# Patient Record
Sex: FEMALE | Race: WHITE | NOT HISPANIC OR LATINO | Employment: OTHER | URBAN - METROPOLITAN AREA
[De-identification: names, ages, dates, MRNs, and addresses within clinical notes are randomized per-mention and may not be internally consistent; named-entity substitution may affect disease eponyms.]

---

## 2017-12-14 ENCOUNTER — OFFICE VISIT (OUTPATIENT)
Dept: URGENT CARE | Facility: PHYSICIAN GROUP | Age: 62
End: 2017-12-14
Payer: COMMERCIAL

## 2017-12-14 ENCOUNTER — HOSPITAL ENCOUNTER (OUTPATIENT)
Dept: RADIOLOGY | Facility: MEDICAL CENTER | Age: 62
End: 2017-12-14
Attending: EMERGENCY MEDICINE
Payer: COMMERCIAL

## 2017-12-14 VITALS
BODY MASS INDEX: 26.97 KG/M2 | SYSTOLIC BLOOD PRESSURE: 140 MMHG | WEIGHT: 171.8 LBS | TEMPERATURE: 98.2 F | HEIGHT: 67 IN | HEART RATE: 107 BPM | OXYGEN SATURATION: 95 % | DIASTOLIC BLOOD PRESSURE: 80 MMHG

## 2017-12-14 DIAGNOSIS — R05.9 COUGH: ICD-10-CM

## 2017-12-14 DIAGNOSIS — J45.20 MILD INTERMITTENT ASTHMA WITHOUT COMPLICATION: ICD-10-CM

## 2017-12-14 PROCEDURE — 71020 DX-CHEST-2 VIEWS: CPT

## 2017-12-14 PROCEDURE — 99214 OFFICE O/P EST MOD 30 MIN: CPT | Performed by: EMERGENCY MEDICINE

## 2017-12-14 RX ORDER — BENZONATATE 100 MG/1
100 CAPSULE ORAL 3 TIMES DAILY PRN
Qty: 60 CAP | Refills: 0 | Status: SHIPPED | OUTPATIENT
Start: 2017-12-14 | End: 2018-08-28

## 2017-12-14 RX ORDER — DOXYCYCLINE HYCLATE 100 MG
100 TABLET ORAL 2 TIMES DAILY
Qty: 20 TAB | Refills: 0 | Status: SHIPPED | OUTPATIENT
Start: 2017-12-14 | End: 2018-08-28

## 2017-12-14 RX ORDER — BUDESONIDE AND FORMOTEROL FUMARATE DIHYDRATE 160; 4.5 UG/1; UG/1
2 AEROSOL RESPIRATORY (INHALATION) 2 TIMES DAILY
Qty: 1 INHALER | Refills: 0 | Status: SHIPPED | OUTPATIENT
Start: 2017-12-14 | End: 2021-07-06 | Stop reason: SDUPTHER

## 2017-12-14 NOTE — PROGRESS NOTES
"Subjective:      Lona Ochoa is a 62 y.o. female who presents with Cough (chills, fever, sinus pain, nausea, x1 month, discuss inhaler)            HPI  Patient is a pleasant 62-year-old female complaining of cough and URI symptoms for the past month which include sinus pressure nausea. Patient is interested in using an inhaler.    Allergies   Allergen Reactions   • Sulfa Drugs      2002-02-20;arthritic symptoms  \"due to my lupus\"     Social History     Social History   • Marital status:      Spouse name: N/A   • Number of children: N/A   • Years of education: N/A     Occupational History   • Not on file.     Social History Main Topics   • Smoking status: Never Smoker   • Smokeless tobacco: Never Used      Comment: denies   • Alcohol use 1.5 oz/week     3 Glasses of wine per week      Comment: holidays   • Drug use: No      Comment: denies   • Sexual activity: Yes     Partners: Male     Other Topics Concern   • Not on file     Social History Narrative   • No narrative on file     Past Medical History:   Diagnosis Date   • ASTHMA    • ASTHMA 12/15/2011   • Cancer (CMS-HCC)     skin melanoma RLE   • Chemotherapy    • CKD (chronic kidney disease), stage III 12/2/2010   • Family history of multiple myeloma 6/11/2014   • Hypertension 12/2/2010   • Lupus (systemic lupus erythematosus) (CMS-HCC) 12/2/2010   • Melanoma of skin (CMS-HCC)    • Mitral valve prolapse    • Personal history of malignant melanoma of skin 2003    right leg     Current Outpatient Prescriptions on File Prior to Visit   Medication Sig Dispense Refill   • albuterol (PROAIR HFA) 108 (90 BASE) MCG/ACT Aero Soln inhalation aerosol Inhale 2 Puffs by mouth every 6 hours as needed for Shortness of Breath. 2 Inhaler 1   • amoxicillin (AMOXIL) 875 MG tablet Take 1 Tab by mouth 2 times a day. 14 Tab 0   • hydrocodone-acetaminophen (NORCO) 5-325 MG Tab per tablet TK 1 T PO Q 4 TO 6 HOURS PRN P  0   • Multiple Vitamin (MULTI-VITAMIN DAILY PO) Take  by " "mouth.     • aspirin (ASA) 81 MG CHEW Take 81 mg by mouth every day.     • vitamin D, Ergocalciferol, (DRISDOL) 97579 UNITS CAPS capsule Take  by mouth every 7 days.       No current facility-administered medications on file prior to visit.      Family History   Problem Relation Age of Onset   • Cancer Mother      multiple myloma   • Heart Disease Father 40   • Hyperlipidemia Sister    • Hypertension Sister    • Cancer Sister      st IV colon CA   • Arthritis Maternal Grandmother 85     gout     Review of Systems   Constitutional: Positive for fever.   HENT: Positive for congestion and sinus pain. Negative for ear discharge, ear pain and sore throat.    Eyes: Negative for discharge.   Respiratory: Positive for cough. Negative for hemoptysis and stridor.    Cardiovascular: Negative for chest pain and palpitations.   Gastrointestinal: Negative for nausea and vomiting.   Musculoskeletal: Negative for neck pain.   Skin: Negative for itching and rash.   Neurological: Negative for sensory change and speech change.          Objective:     /80   Pulse (!) 107   Temp 36.8 °C (98.2 °F)   Ht 1.689 m (5' 6.5\")   Wt 77.9 kg (171 lb 12.8 oz)   SpO2 95%   BMI 27.31 kg/m²      Physical Exam   Constitutional: She appears well-developed and well-nourished. No distress.   HENT:   Head: Normocephalic and atraumatic.   Right Ear: External ear normal.   Left Ear: External ear normal.   Eyes: Right eye exhibits no discharge. Left eye exhibits no discharge.   Cardiovascular: Normal rate and regular rhythm.    Pulmonary/Chest: Breath sounds normal. She has no wheezes. She has no rales.   Abdominal: She exhibits no distension. There is no tenderness.   Musculoskeletal: Normal range of motion.   Neurological: She is alert.   Skin: Skin is warm and dry. She is not diaphoretic.   Psychiatric: She has a normal mood and affect. Her behavior is normal.   Vitals reviewed.            Chest x-ray negative for acute " infiltrates.  Assessment/Plan:     1. Cough    I am recommending the patient initiate/ continue hydration efforts including the use of a vaporizer/humidifier/ netti pot. I also recommend the pt, initiate Mucinex (if older than 4) Sudafed or Dayquil if not hypertensive. In addition the patient will initiate the prescribed prescription medication/s: Patient will be put on Vibramycin 100 mg twice a day given a prescription for Tessalon Perles as well as refill on her Symbicort she has albuterol inhalers.. If the patient's condition exacerbates with worsening dysphagia, shortness of breath, uncontrolled fever, headache or chest pressure he/she will return immediately to the urgent care or go to  the emergency department for further evaluation.    Vna Belcher    - DX-CHEST-2 VIEWS; Future  - benzonatate (TESSALON) 100 MG Cap; Take 1 Cap by mouth 3 times a day as needed for Cough.  Dispense: 60 Cap; Refill: 0  - doxycycline (VIBRAMYCIN) 100 MG Tab; Take 1 Tab by mouth 2 times a day.  Dispense: 20 Tab; Refill: 0    Symbicort refill was given to the patient.

## 2017-12-15 ASSESSMENT — ENCOUNTER SYMPTOMS
NAUSEA: 0
STRIDOR: 0
SORE THROAT: 0
EYE DISCHARGE: 0
VOMITING: 0
SPEECH CHANGE: 0
FEVER: 1
SENSORY CHANGE: 0
SINUS PAIN: 1
HEMOPTYSIS: 0
NECK PAIN: 0
COUGH: 1
PALPITATIONS: 0

## 2018-01-05 DIAGNOSIS — J45.901 EXACERBATION OF ASTHMA, UNSPECIFIED ASTHMA SEVERITY, UNSPECIFIED WHETHER PERSISTENT: ICD-10-CM

## 2018-01-05 DIAGNOSIS — J45.901 ASTHMA EXACERBATION: ICD-10-CM

## 2018-01-05 RX ORDER — ALBUTEROL SULFATE 90 UG/1
2 AEROSOL, METERED RESPIRATORY (INHALATION) EVERY 6 HOURS PRN
Qty: 36 G | Refills: 0 | OUTPATIENT
Start: 2018-01-05

## 2018-01-05 RX ORDER — ALBUTEROL SULFATE 90 UG/1
2 AEROSOL, METERED RESPIRATORY (INHALATION) EVERY 6 HOURS PRN
Qty: 1 INHALER | Refills: 0 | Status: SHIPPED | OUTPATIENT
Start: 2018-01-05 | End: 2021-07-06 | Stop reason: SDUPTHER

## 2018-01-05 NOTE — TELEPHONE ENCOUNTER
Was the patient seen in the last year in this department? No    Does patient have an active prescription for medications requested? No     Received Request Via: Pharmacy      Pt met protocol?: NO    OV   12/16

## 2018-08-28 ENCOUNTER — OFFICE VISIT (OUTPATIENT)
Dept: MEDICAL GROUP | Facility: MEDICAL CENTER | Age: 63
End: 2018-08-28
Payer: COMMERCIAL

## 2018-08-28 VITALS
HEIGHT: 67 IN | RESPIRATION RATE: 14 BRPM | WEIGHT: 171 LBS | DIASTOLIC BLOOD PRESSURE: 78 MMHG | TEMPERATURE: 98 F | HEART RATE: 79 BPM | OXYGEN SATURATION: 97 % | SYSTOLIC BLOOD PRESSURE: 134 MMHG | BODY MASS INDEX: 26.84 KG/M2

## 2018-08-28 DIAGNOSIS — E78.00 PURE HYPERCHOLESTEROLEMIA: ICD-10-CM

## 2018-08-28 DIAGNOSIS — Z12.31 ENCOUNTER FOR SCREENING MAMMOGRAM FOR BREAST CANCER: ICD-10-CM

## 2018-08-28 DIAGNOSIS — Z13.29 SCREENING FOR THYROID DISORDER: ICD-10-CM

## 2018-08-28 DIAGNOSIS — R07.9 CHEST PAIN, UNSPECIFIED TYPE: ICD-10-CM

## 2018-08-28 DIAGNOSIS — M54.6 ACUTE LEFT-SIDED THORACIC BACK PAIN: ICD-10-CM

## 2018-08-28 DIAGNOSIS — Z82.49 FAMILY HISTORY OF EARLY CAD: ICD-10-CM

## 2018-08-28 DIAGNOSIS — E55.9 VITAMIN D INSUFFICIENCY: ICD-10-CM

## 2018-08-28 PROCEDURE — 99214 OFFICE O/P EST MOD 30 MIN: CPT | Performed by: NURSE PRACTITIONER

## 2018-08-28 ASSESSMENT — PATIENT HEALTH QUESTIONNAIRE - PHQ9: CLINICAL INTERPRETATION OF PHQ2 SCORE: 0

## 2018-08-28 NOTE — PROGRESS NOTES
CC: Back Pain (back pain on left side last night, then right side had pain x few weeks ago. A lot of family history of MI's.)        HPI:     Lona is a [facility] patient, all problems are new to me today, presents today for the followin. Acute left-sided thoracic back pain/ Chest pain, unspecified type/Family history of early CAD  Here to stating approximately 2 weeks ago she woke up in the middle the night with some right-sided chest pain. she fell back asleep with and was self-resolved in the morning.  She states over the weekend she was drinking coffee out on her patio and had a dull aching sensation that spread over the left side of her back.  She describes the pain developing like the way of flower would Salol starting off really mild and then worsening.  Dull ache.  It lasted may be 60 seconds at most.  She does state that both of these occurrences happened around the time she had an edible marijuana gummy.  However she uses other times without any of the symptoms.      She has had a full workup with cardiology over the last 10 years including echoes and stress test.  All of these were normal.  She was told she had a mild murmur.  She was not recommended for medications or for routine cardiac follow-up.    Current Outpatient Prescriptions   Medication Sig Dispense Refill   • albuterol (PROAIR HFA) 108 (90 Base) MCG/ACT Aero Soln inhalation aerosol Inhale 2 Puffs by mouth every 6 hours as needed for Shortness of Breath. 1 Inhaler 0   • budesonide-formoterol (SYMBICORT) 160-4.5 MCG/ACT Aerosol Inhale 2 Puffs by mouth 2 Times a Day. 1 Inhaler 0   • Multiple Vitamin (MULTI-VITAMIN DAILY PO) Take  by mouth.     • aspirin (ASA) 81 MG CHEW Take 81 mg by mouth every day.     • vitamin D, Ergocalciferol, (DRISDOL) 18743 UNITS CAPS capsule Take  by mouth every 7 days.       No current facility-administered medications for this visit.      Social History   Substance Use Topics   • Smoking status: Never Smoker   •  "Smokeless tobacco: Never Used      Comment: denies   • Alcohol use 1.5 oz/week     3 Glasses of wine per week      Comment: holidays     I reviewed patients allergies, problem list and medications today in [facility].    ROS: Any/all pertinent positives listed in the HPI, otherwise all others reviewed are negative today.      /78   Pulse 79   Temp 36.7 °C (98 °F)   Resp 14   Ht 1.689 m (5' 6.5\")   Wt 77.6 kg (171 lb)   SpO2 97%   BMI 27.19 kg/m²     Exam:    Gen: Alert and oriented, No apparent distress. WDWN  Psych: A+Ox3, normal affect and mood  Skin: Warm, dry and intact. Good turgor   No rashes in visible areas.  Eye: Conjunctiva clear, lids normal  ENMT: Lips without lesions, good dentition  Neck: No Lymphadenopathy, Thyromegaly, Bruits.   Trachea midline, no masses  Lungs: Clear to auscultation bilaterally, no rales or rhonchi   Unlabored respiratory effort.   CV: Regular rate and rhythm, S1, S2. No murmurs.   No Edema  Abd: Soft non tender, non distended. Normal active bowel sounds.    No Hepatosplenomegaly, No pulsatile masses.   Ext: No clubbin g, cyanosis, edema.   EKG Interpretation-HR is 59 normal EKG, normal sinus brielle, unchanged from previous tracings  She is nontender over her back.  Full range of motion of extremities.  Nontender over the frontal ribs          Assessment and Plan.   63 y.o. female with the following issues.    1. Acute left-sided thoracic back pain/c hest pain, unspecified type/Family history of early CAD  Clinically stable.  Reassurance.  She is currently completely asymptomatic history and exam are not consistent with cardiac etiology.  EKG is normal.    Unsure of etiology of her discomfort.  She will follow-up if it continues to happen however today we will order some basic labs  - EKG - Clinic Performed  - COMP METABOLIC PANEL; Future  - CBC WITH DIFFERENTIAL; Future    3.  pure hypercholesterolemia   routine labs ordered   - LIPID PROFILE; Future    5. Vitamin D " insufficiency  Routine labs ordered  - VITAMIN D,25 HYDROXY; Future    6. Screening for thyroid disorder  Routine lab ordered  - TSH; Future    7. Encounter for screening mammogram for breast cancer  Ordered  - MA-SCREENING DIGITAL MAMMO; Future

## 2018-09-27 ENCOUNTER — HOSPITAL ENCOUNTER (OUTPATIENT)
Dept: LAB | Facility: MEDICAL CENTER | Age: 63
End: 2018-09-27
Attending: NURSE PRACTITIONER
Payer: COMMERCIAL

## 2018-09-27 DIAGNOSIS — Z13.29 SCREENING FOR THYROID DISORDER: ICD-10-CM

## 2018-09-27 DIAGNOSIS — M54.6 ACUTE LEFT-SIDED THORACIC BACK PAIN: ICD-10-CM

## 2018-09-27 DIAGNOSIS — R07.9 CHEST PAIN, UNSPECIFIED TYPE: ICD-10-CM

## 2018-09-27 DIAGNOSIS — E55.9 VITAMIN D INSUFFICIENCY: ICD-10-CM

## 2018-09-27 DIAGNOSIS — E78.00 PURE HYPERCHOLESTEROLEMIA: ICD-10-CM

## 2018-09-27 LAB
ALBUMIN SERPL BCP-MCNC: 4.3 G/DL (ref 3.2–4.9)
ALBUMIN/GLOB SERPL: 1.2 G/DL
ALP SERPL-CCNC: 77 U/L (ref 30–99)
ALT SERPL-CCNC: 14 U/L (ref 2–50)
ANION GAP SERPL CALC-SCNC: 8 MMOL/L (ref 0–11.9)
AST SERPL-CCNC: 17 U/L (ref 12–45)
BASOPHILS # BLD AUTO: 1.3 % (ref 0–1.8)
BASOPHILS # BLD: 0.04 K/UL (ref 0–0.12)
BILIRUB SERPL-MCNC: 1.2 MG/DL (ref 0.1–1.5)
BUN SERPL-MCNC: 17 MG/DL (ref 8–22)
CALCIUM SERPL-MCNC: 9.8 MG/DL (ref 8.5–10.5)
CHLORIDE SERPL-SCNC: 104 MMOL/L (ref 96–112)
CHOLEST SERPL-MCNC: 180 MG/DL (ref 100–199)
CO2 SERPL-SCNC: 27 MMOL/L (ref 20–33)
CREAT SERPL-MCNC: 0.76 MG/DL (ref 0.5–1.4)
EOSINOPHIL # BLD AUTO: 0.07 K/UL (ref 0–0.51)
EOSINOPHIL NFR BLD: 2.3 % (ref 0–6.9)
ERYTHROCYTE [DISTWIDTH] IN BLOOD BY AUTOMATED COUNT: 41.7 FL (ref 35.9–50)
FASTING STATUS PATIENT QL REPORTED: NORMAL
GLOBULIN SER CALC-MCNC: 3.5 G/DL (ref 1.9–3.5)
GLUCOSE SERPL-MCNC: 98 MG/DL (ref 65–99)
HCT VFR BLD AUTO: 39.4 % (ref 37–47)
HDLC SERPL-MCNC: 61 MG/DL
HGB BLD-MCNC: 12.8 G/DL (ref 12–16)
IMM GRANULOCYTES # BLD AUTO: 0.01 K/UL (ref 0–0.11)
IMM GRANULOCYTES NFR BLD AUTO: 0.3 % (ref 0–0.9)
LDLC SERPL CALC-MCNC: 100 MG/DL
LYMPHOCYTES # BLD AUTO: 1.16 K/UL (ref 1–4.8)
LYMPHOCYTES NFR BLD: 37.8 % (ref 22–41)
MCH RBC QN AUTO: 29.6 PG (ref 27–33)
MCHC RBC AUTO-ENTMCNC: 32.5 G/DL (ref 33.6–35)
MCV RBC AUTO: 91.2 FL (ref 81.4–97.8)
MONOCYTES # BLD AUTO: 0.25 K/UL (ref 0–0.85)
MONOCYTES NFR BLD AUTO: 8.1 % (ref 0–13.4)
NEUTROPHILS # BLD AUTO: 1.54 K/UL (ref 2–7.15)
NEUTROPHILS NFR BLD: 50.2 % (ref 44–72)
NRBC # BLD AUTO: 0 K/UL
NRBC BLD-RTO: 0 /100 WBC
PLATELET # BLD AUTO: 239 K/UL (ref 164–446)
PMV BLD AUTO: 11.6 FL (ref 9–12.9)
POTASSIUM SERPL-SCNC: 4.4 MMOL/L (ref 3.6–5.5)
PROT SERPL-MCNC: 7.8 G/DL (ref 6–8.2)
RBC # BLD AUTO: 4.32 M/UL (ref 4.2–5.4)
SODIUM SERPL-SCNC: 139 MMOL/L (ref 135–145)
TRIGL SERPL-MCNC: 96 MG/DL (ref 0–149)
WBC # BLD AUTO: 3.1 K/UL (ref 4.8–10.8)

## 2018-09-27 PROCEDURE — 80053 COMPREHEN METABOLIC PANEL: CPT

## 2018-09-27 PROCEDURE — 82306 VITAMIN D 25 HYDROXY: CPT

## 2018-09-27 PROCEDURE — 36415 COLL VENOUS BLD VENIPUNCTURE: CPT

## 2018-09-27 PROCEDURE — 85025 COMPLETE CBC W/AUTO DIFF WBC: CPT

## 2018-09-27 PROCEDURE — 80061 LIPID PANEL: CPT

## 2018-09-27 PROCEDURE — 84443 ASSAY THYROID STIM HORMONE: CPT

## 2018-09-28 LAB
25(OH)D3 SERPL-MCNC: 21 NG/ML (ref 30–100)
TSH SERPL DL<=0.005 MIU/L-ACNC: 3.84 UIU/ML (ref 0.38–5.33)

## 2018-10-19 ENCOUNTER — HOSPITAL ENCOUNTER (OUTPATIENT)
Dept: RADIOLOGY | Facility: MEDICAL CENTER | Age: 63
End: 2018-10-19
Attending: NURSE PRACTITIONER
Payer: COMMERCIAL

## 2018-10-19 DIAGNOSIS — Z12.31 VISIT FOR SCREENING MAMMOGRAM: ICD-10-CM

## 2018-10-19 PROCEDURE — 77067 SCR MAMMO BI INCL CAD: CPT

## 2018-11-21 ENCOUNTER — OFFICE VISIT (OUTPATIENT)
Dept: MEDICAL GROUP | Facility: PHYSICIAN GROUP | Age: 63
End: 2018-11-21
Payer: COMMERCIAL

## 2018-11-21 VITALS
DIASTOLIC BLOOD PRESSURE: 70 MMHG | SYSTOLIC BLOOD PRESSURE: 100 MMHG | OXYGEN SATURATION: 96 % | BODY MASS INDEX: 25.9 KG/M2 | WEIGHT: 165 LBS | HEIGHT: 67 IN | TEMPERATURE: 97.2 F | HEART RATE: 93 BPM

## 2018-11-21 DIAGNOSIS — Z23 NEED FOR VACCINATION: ICD-10-CM

## 2018-11-21 DIAGNOSIS — J45.909 ASTHMA IN ADULT WITHOUT COMPLICATION, UNSPECIFIED ASTHMA SEVERITY, UNSPECIFIED WHETHER PERSISTENT: Chronic | ICD-10-CM

## 2018-11-21 DIAGNOSIS — Z85.820 PERSONAL HISTORY OF MALIGNANT MELANOMA OF SKIN: ICD-10-CM

## 2018-11-21 DIAGNOSIS — M32.9 SYSTEMIC LUPUS ERYTHEMATOSUS, UNSPECIFIED SLE TYPE, UNSPECIFIED ORGAN INVOLVEMENT STATUS (HCC): ICD-10-CM

## 2018-11-21 DIAGNOSIS — E55.9 VITAMIN D INSUFFICIENCY: ICD-10-CM

## 2018-11-21 PROBLEM — Z80.0 FAMILY HISTORY OF COLON CANCER: Status: ACTIVE | Noted: 2018-11-21

## 2018-11-21 PROCEDURE — 99214 OFFICE O/P EST MOD 30 MIN: CPT | Mod: 25 | Performed by: FAMILY MEDICINE

## 2018-11-21 PROCEDURE — 90686 IIV4 VACC NO PRSV 0.5 ML IM: CPT | Performed by: FAMILY MEDICINE

## 2018-11-21 PROCEDURE — 90471 IMMUNIZATION ADMIN: CPT | Performed by: FAMILY MEDICINE

## 2018-11-21 RX ORDER — MULTIVIT WITH MINERALS/LUTEIN
TABLET ORAL
COMMUNITY

## 2018-11-21 RX ORDER — ERGOCALCIFEROL 1.25 MG/1
50000 CAPSULE ORAL
Qty: 12 CAP | Refills: 0 | Status: SHIPPED | OUTPATIENT
Start: 2018-11-21 | End: 2021-09-13

## 2018-11-21 NOTE — PROGRESS NOTES
CC:  Diagnoses of Asthma in adult without complication, unspecified asthma severity, unspecified whether persistent, Systemic lupus erythematosus, unspecified SLE type, unspecified organ involvement status (HCC), Need for vaccination, and Vitamin D insufficiency were pertinent to this visit.    HISTORY OF THE PRESENT ILLNESS: Patient is a 63 y.o. female. This pleasant patient is here today to establish care.  She does have a history of melanoma in her leg and is concerned about a small lesion on her right chest.  She is going to make an appointment with her dermatologist  For this lesion.    Health Maintenance: She will receive her flu vaccine today.      Lupus (systemic lupus erythematosus)  This was initially diagnosed in 1979.  She had been on immunosuppressants in the past but is currently not taking taking anything since 2003.  She does mention that she changed her diet, lost weight and hasn't had any flare ups since 2002.    Asthma in adult  This is a chronic medical problem which is well controlled with as needed inhalers.  She is allergic to ragweed and does mention that her allergies often lead to wheezing.  She currently denies any coughing or shortness of breath.     Vitamin D insufficiency  This is a new diagnosis for her.  Her vitamin D level was decreased at 21 back in September 2018.  She is currently not taking any vitamin D supplementation.      Allergies: Sulfa drugs    Current Outpatient Prescriptions Ordered in [facility]   Medication Sig Dispense Refill   • ergocalciferol (DRISDOL) 66670 UNIT capsule Take 1 Cap by mouth every 7 days. 12 Cap 0   • Ascorbic Acid (VITAMIN C) 1000 MG Tab Take  by mouth.     • albuterol (PROAIR HFA) 108 (90 Base) MCG/ACT Aero Soln inhalation aerosol Inhale 2 Puffs by mouth every 6 hours as needed for Shortness of Breath. 1 Inhaler 0   • budesonide-formoterol (SYMBICORT) 160-4.5 MCG/ACT Aerosol Inhale 2 Puffs by mouth 2 Times a Day. 1 Inhaler 0   • Multiple Vitamin  (MULTI-VITAMIN DAILY PO) Take  by mouth.     • aspirin (ASA) 81 MG CHEW Take 81 mg by mouth every day.       No current [facility]-ordered facility-administered medications on file.        Past Medical History:   Diagnosis Date   • ASTHMA    • ASTHMA 12/15/2011   • Cancer (HCC)     skin melanoma RLE   • Chemotherapy    • CKD (chronic kidney disease), stage III (HCC) 12/2/2010   • Family history of multiple myeloma 6/11/2014   • Hypertension 12/2/2010   • Lupus (systemic lupus erythematosus) (HCC) 12/2/2010   • Melanoma of skin (HCC)    • Mitral valve prolapse    • Personal history of malignant melanoma of skin 2003    right leg       Past Surgical History:   Procedure Laterality Date   • LAMINOTOMY     • OTHER      RLE skin melanoma excised   • OTHER ORTHOPEDIC SURGERY      spinal    • PRIMARY C SECTION  1986,1987   • TONSILLECTOMY         Social History   Substance Use Topics   • Smoking status: Never Smoker   • Smokeless tobacco: Never Used      Comment: denies   • Alcohol use 1.5 oz/week     3 Glasses of wine per week      Comment: holidays       Social History     Social History Narrative   • No narrative on file       Family History   Problem Relation Age of Onset   • Cancer Mother         multiple myloma   • Heart Disease Father 40   • Hyperlipidemia Sister    • Hypertension Sister    • Cancer Sister         st IV colon CA   • Arthritis Maternal Grandmother 85        gout       ROS:     - Constitutional: Negative for fever, chills, and fatigue   - Eyes:  Negative blurry vision or eye pain    - ENT: Negative for ear pain, rhinorrhea, sinus congestion, sore throat    - Respiratory: Negative for cough or shortness of breath    - Cardiovascular: Negative for chest pain, palpitations    - Gastrointestinal: Negative for heartburn, nausea, vomiting, abdominal pain,     - Genitourinary: Negative for dysuria    - Musculoskeletal: Negative for myalgias    - Skin:small raised lesion on right chest Negative for rash, itching     "- Neurological: Negative for headaches, dizziness    - Endo:  Negative for increased thirst or polyuria    - Heme/Lymph: Does not bruise/bleed easily.     - Psychiatric/Behavioral: Negative for depression and anxiety   .      Exam: Blood pressure 100/70, pulse 93, temperature 36.2 °C (97.2 °F), temperature source Temporal, height 1.689 m (5' 6.5\"), weight 74.8 kg (165 lb), SpO2 96 %. Body mass index is 26.23 kg/m².    General:  Normal appearing. No distress.  Eyes:  Eyes conjunctiva clear lids without ptosis, pupils equal and reactive to light accommodation,   ENMT:   ears normal shape and contour, canals are clear bilaterally, tympanic membranes are benign, nasal mucosa benign, oropharynx is without erythema, edema or exudates.   Neck:  Supple without JVD or bruit. Thyroid is not enlarged.  Pulmonary:  Clear to ausculation.  Normal effort. No rales, ronchi, or wheezing.  Cardiovascular:  Regular rate and rhythm   Abdomen:  Soft, nontender, nondistended. Normal bowel sounds.  Neurologic:  No tremors  Lymph:  No cervical, supraclavicular  lymph nodes are palpable  Skin: 0.5 cm raised skin colored lesion, well demarcated, nonpruritic and nontender    Musculoskeletal:  Normal gait. No extremity cyanosis, clubbing, or edema.  Psych:   Normal mood and affect. Alert and oriented x3. Judgment and insight is normal.    Please note that this dictation was created using voice recognition software. I have made every reasonable attempt to correct obvious errors, but I expect that there are errors of grammar and possibly content that I did not discover before finalizing the note.      Assessment/Plan  1. Asthma in adult without complication, unspecified asthma severity, unspecified whether persistent  This is a chronic medical problem which is well controlled with as needed inhalers.    2. Systemic lupus erythematosus, unspecified SLE type, unspecified organ involvement status (HCC)  Chronic medical problem without any current " flareups.  We will just continue to monitor.    3. Need for vaccination  She will receive her flu vaccine today.  - Influenza Vaccine Quad Injection >3Y (PF)    4. Vitamin D insufficiency  This is a new problem and we will start her on high-dose vitamin D for 12 weeks and then recheck a level.  - ergocalciferol (DRISDOL) 20817 UNIT capsule; Take 1 Cap by mouth every 7 days.  Dispense: 12 Cap; Refill: 0  - VITAMIN D,25 HYDROXY; Future    5. Personal history of malignant melanoma of skin. She has noticed a small mole on her right chest and is concerned about it.  She is established with dermatology and has not seen them 2016.  It doesn't itch and isn't tender.  Encouraged her to follow-up with dermatology.        Return in about 6 months (around 5/21/2019).

## 2019-04-15 ENCOUNTER — APPOINTMENT (RX ONLY)
Dept: URBAN - METROPOLITAN AREA CLINIC 22 | Facility: CLINIC | Age: 64
Setting detail: DERMATOLOGY
End: 2019-04-15

## 2019-04-15 DIAGNOSIS — L81.4 OTHER MELANIN HYPERPIGMENTATION: ICD-10-CM

## 2019-04-15 DIAGNOSIS — Z85.820 PERSONAL HISTORY OF MALIGNANT MELANOMA OF SKIN: ICD-10-CM

## 2019-04-15 DIAGNOSIS — D18.0 HEMANGIOMA: ICD-10-CM

## 2019-04-15 DIAGNOSIS — D22 MELANOCYTIC NEVI: ICD-10-CM

## 2019-04-15 DIAGNOSIS — L82.1 OTHER SEBORRHEIC KERATOSIS: ICD-10-CM

## 2019-04-15 PROBLEM — D22.72 MELANOCYTIC NEVI OF LEFT LOWER LIMB, INCLUDING HIP: Status: ACTIVE | Noted: 2019-04-15

## 2019-04-15 PROBLEM — D22.71 MELANOCYTIC NEVI OF RIGHT LOWER LIMB, INCLUDING HIP: Status: ACTIVE | Noted: 2019-04-15

## 2019-04-15 PROBLEM — D22.5 MELANOCYTIC NEVI OF TRUNK: Status: ACTIVE | Noted: 2019-04-15

## 2019-04-15 PROBLEM — D18.01 HEMANGIOMA OF SKIN AND SUBCUTANEOUS TISSUE: Status: ACTIVE | Noted: 2019-04-15

## 2019-04-15 PROCEDURE — 99203 OFFICE O/P NEW LOW 30 MIN: CPT

## 2019-04-15 PROCEDURE — ? COUNSELING

## 2019-04-15 ASSESSMENT — LOCATION DETAILED DESCRIPTION DERM
LOCATION DETAILED: SUPERIOR THORACIC SPINE
LOCATION DETAILED: RIGHT MEDIAL SUPERIOR CHEST
LOCATION DETAILED: LEFT SUPERIOR MEDIAL UPPER BACK
LOCATION DETAILED: RIGHT PROXIMAL PRETIBIAL REGION
LOCATION DETAILED: LEFT PROXIMAL PRETIBIAL REGION
LOCATION DETAILED: INFERIOR MID FOREHEAD
LOCATION DETAILED: LEFT INFERIOR ANTERIOR NECK
LOCATION DETAILED: RIGHT MEDIAL PLANTAR 1ST TOE
LOCATION DETAILED: MIDDLE STERNUM
LOCATION DETAILED: RIGHT ANTERIOR DISTAL THIGH
LOCATION DETAILED: INFERIOR THORACIC SPINE
LOCATION DETAILED: LEFT VENTRAL PROXIMAL FOREARM
LOCATION DETAILED: RIGHT VENTRAL PROXIMAL FOREARM

## 2019-04-15 ASSESSMENT — LOCATION SIMPLE DESCRIPTION DERM
LOCATION SIMPLE: LEFT UPPER BACK
LOCATION SIMPLE: LEFT FOREARM
LOCATION SIMPLE: CHEST
LOCATION SIMPLE: RIGHT PRETIBIAL REGION
LOCATION SIMPLE: LEFT PRETIBIAL REGION
LOCATION SIMPLE: INFERIOR FOREHEAD
LOCATION SIMPLE: UPPER BACK
LOCATION SIMPLE: RIGHT THIGH
LOCATION SIMPLE: LEFT ANTERIOR NECK
LOCATION SIMPLE: PLANTAR SURFACE OF RIGHT 1ST TOE
LOCATION SIMPLE: RIGHT FOREARM

## 2019-04-15 ASSESSMENT — LOCATION ZONE DERM
LOCATION ZONE: TOE
LOCATION ZONE: TRUNK
LOCATION ZONE: LEG
LOCATION ZONE: FACE
LOCATION ZONE: NECK
LOCATION ZONE: ARM

## 2019-04-15 NOTE — HPI: MELANOMA F/U (HISTORY OF MALIGNANT MELANOMA)
What Is The Reason For Today's Visit?: History of Melanoma
What Stage Is The Melanoma?: Stage 0
Year Excised?: 2002

## 2019-11-20 ENCOUNTER — HOSPITAL ENCOUNTER (OUTPATIENT)
Dept: RADIOLOGY | Facility: MEDICAL CENTER | Age: 64
End: 2019-11-20
Attending: FAMILY MEDICINE
Payer: COMMERCIAL

## 2019-11-20 DIAGNOSIS — Z12.31 VISIT FOR SCREENING MAMMOGRAM: ICD-10-CM

## 2019-11-20 PROCEDURE — 77067 SCR MAMMO BI INCL CAD: CPT

## 2020-07-06 ENCOUNTER — NURSE TRIAGE (OUTPATIENT)
Dept: HEALTH INFORMATION MANAGEMENT | Facility: OTHER | Age: 65
End: 2020-07-06

## 2020-07-06 ENCOUNTER — TELEPHONE (OUTPATIENT)
Dept: MEDICAL GROUP | Facility: PHYSICIAN GROUP | Age: 65
End: 2020-07-06

## 2020-07-06 DIAGNOSIS — Z11.59 ENCOUNTER FOR SCREENING FOR OTHER VIRAL DISEASES: ICD-10-CM

## 2020-07-06 NOTE — TELEPHONE ENCOUNTER
Phone Number Called:[phone] (home)        Call outcome: Spoke to patient regarding message below.    Message: Patient notified of Covid testing that was ordered.

## 2020-07-06 NOTE — TELEPHONE ENCOUNTER
1. Caller Name: Lona                 Call Back Number: [phone]  [facility] PCP or Specialty Provider: Yes Dr. Aguillon        2.  In the last two weeks, has the patient had any new or worsening symptoms (not explained by alternative diagnosis)? Yes, the patient reports the following COVID-19 consistent symptoms: cough.    3.  Does patient have any comoribidities? Asthma, Lupus in remission    4.  Has the patient traveled in the last 14 days OR had any known contact with someone who is suspected or confirmed to have COVID-19?  No.    5. Disposition: Deferred to [facility] Provider    Note routed to [facility] Provider: Provider action needed: Pt would like order for COVID antibody test. Please call patient when order is placed.  Pt had flu like symptoms May 15th and is expecting a new grandbaby and wants to see if she had COVID at that time. She also has had a lingering cough since beg April.

## 2020-07-07 ENCOUNTER — HOSPITAL ENCOUNTER (OUTPATIENT)
Dept: LAB | Facility: MEDICAL CENTER | Age: 65
End: 2020-07-07
Attending: FAMILY MEDICINE
Payer: COMMERCIAL

## 2020-07-07 DIAGNOSIS — Z11.59 ENCOUNTER FOR SCREENING FOR OTHER VIRAL DISEASES: ICD-10-CM

## 2020-07-07 LAB — SARS-COV-2 AB SERPL QL IA: NORMAL

## 2020-07-07 PROCEDURE — 36415 COLL VENOUS BLD VENIPUNCTURE: CPT

## 2020-07-07 PROCEDURE — 86769 SARS-COV-2 COVID-19 ANTIBODY: CPT

## 2020-08-03 ENCOUNTER — APPOINTMENT (RX ONLY)
Dept: URBAN - METROPOLITAN AREA CLINIC 22 | Facility: CLINIC | Age: 65
Setting detail: DERMATOLOGY
End: 2020-08-03

## 2020-08-03 DIAGNOSIS — L82.1 OTHER SEBORRHEIC KERATOSIS: ICD-10-CM

## 2020-08-03 DIAGNOSIS — D22 MELANOCYTIC NEVI: ICD-10-CM

## 2020-08-03 DIAGNOSIS — L81.4 OTHER MELANIN HYPERPIGMENTATION: ICD-10-CM

## 2020-08-03 DIAGNOSIS — Z85.820 PERSONAL HISTORY OF MALIGNANT MELANOMA OF SKIN: ICD-10-CM

## 2020-08-03 PROBLEM — D48.5 NEOPLASM OF UNCERTAIN BEHAVIOR OF SKIN: Status: ACTIVE | Noted: 2020-08-03

## 2020-08-03 PROBLEM — D22.72 MELANOCYTIC NEVI OF LEFT LOWER LIMB, INCLUDING HIP: Status: ACTIVE | Noted: 2020-08-03

## 2020-08-03 PROBLEM — D22.71 MELANOCYTIC NEVI OF RIGHT LOWER LIMB, INCLUDING HIP: Status: ACTIVE | Noted: 2020-08-03

## 2020-08-03 PROBLEM — D22.5 MELANOCYTIC NEVI OF TRUNK: Status: ACTIVE | Noted: 2020-08-03

## 2020-08-03 PROCEDURE — ? BIOPSY BY SHAVE METHOD

## 2020-08-03 PROCEDURE — 56605 BIOPSY OF VULVA/PERINEUM: CPT

## 2020-08-03 PROCEDURE — ? COUNSELING

## 2020-08-03 PROCEDURE — 99214 OFFICE O/P EST MOD 30 MIN: CPT | Mod: 25

## 2020-08-03 ASSESSMENT — LOCATION SIMPLE DESCRIPTION DERM
LOCATION SIMPLE: RIGHT PRETIBIAL REGION
LOCATION SIMPLE: RIGHT THIGH
LOCATION SIMPLE: LEFT ANTERIOR NECK
LOCATION SIMPLE: LEFT PRETIBIAL REGION
LOCATION SIMPLE: LABIA MAJORA
LOCATION SIMPLE: CHEST
LOCATION SIMPLE: INFERIOR FOREHEAD
LOCATION SIMPLE: RIGHT FOREARM
LOCATION SIMPLE: UPPER BACK
LOCATION SIMPLE: LEFT FOREARM

## 2020-08-03 ASSESSMENT — LOCATION DETAILED DESCRIPTION DERM
LOCATION DETAILED: LEFT PROXIMAL PRETIBIAL REGION
LOCATION DETAILED: INFERIOR THORACIC SPINE
LOCATION DETAILED: LEFT VENTRAL PROXIMAL FOREARM
LOCATION DETAILED: LEFT LABIUM MAJUS
LOCATION DETAILED: LEFT INFERIOR ANTERIOR NECK
LOCATION DETAILED: INFERIOR MID FOREHEAD
LOCATION DETAILED: RIGHT PROXIMAL PRETIBIAL REGION
LOCATION DETAILED: RIGHT MEDIAL SUPERIOR CHEST
LOCATION DETAILED: RIGHT ANTERIOR DISTAL THIGH
LOCATION DETAILED: RIGHT VENTRAL PROXIMAL FOREARM
LOCATION DETAILED: SUPERIOR THORACIC SPINE

## 2020-08-03 ASSESSMENT — LOCATION ZONE DERM
LOCATION ZONE: NECK
LOCATION ZONE: VULVA
LOCATION ZONE: TRUNK
LOCATION ZONE: ARM
LOCATION ZONE: LEG
LOCATION ZONE: FACE

## 2021-01-25 ENCOUNTER — APPOINTMENT (OUTPATIENT)
Dept: MEDICAL GROUP | Facility: PHYSICIAN GROUP | Age: 66
End: 2021-01-25
Payer: COMMERCIAL

## 2021-01-28 ENCOUNTER — TELEMEDICINE (OUTPATIENT)
Dept: MEDICAL GROUP | Facility: PHYSICIAN GROUP | Age: 66
End: 2021-01-28
Payer: COMMERCIAL

## 2021-01-28 VITALS
HEIGHT: 67 IN | SYSTOLIC BLOOD PRESSURE: 124 MMHG | BODY MASS INDEX: 26.13 KG/M2 | WEIGHT: 166.5 LBS | DIASTOLIC BLOOD PRESSURE: 64 MMHG

## 2021-01-28 DIAGNOSIS — Z85.820 PERSONAL HISTORY OF MALIGNANT MELANOMA OF SKIN: ICD-10-CM

## 2021-01-28 DIAGNOSIS — H53.8 BLURRED VISION, BILATERAL: Primary | ICD-10-CM

## 2021-01-28 DIAGNOSIS — Z00.00 WELL ADULT EXAM: ICD-10-CM

## 2021-01-28 DIAGNOSIS — G45.9 TIA (TRANSIENT ISCHEMIC ATTACK): ICD-10-CM

## 2021-01-28 DIAGNOSIS — Z12.31 ENCOUNTER FOR SCREENING MAMMOGRAM FOR MALIGNANT NEOPLASM OF BREAST: ICD-10-CM

## 2021-01-28 PROCEDURE — 99214 OFFICE O/P EST MOD 30 MIN: CPT | Performed by: NURSE PRACTITIONER

## 2021-01-28 ASSESSMENT — PATIENT HEALTH QUESTIONNAIRE - PHQ9: CLINICAL INTERPRETATION OF PHQ2 SCORE: 0

## 2021-01-28 NOTE — ASSESSMENT & PLAN NOTE
Patient tells me that a couple of days ago she was sitting on the couch watching TV with her  when she experienced an episode of extremely blurry vision and changes in her eyes, followed by pressure behind her eyes, followed by a sensation of being able to feel her heart beat rapidly, not in her chest but in her face and teeth.  She found this episode concerning, however she did not seek medical attention as it resolved within minutes.  She does have a history of having TIA episodes in the past, and wonders if that is what this was.  She has had a cardiac work-up in the past, however this was 10 years ago, and has not been followed by cardiology since.  She denies dizziness or shortness of breath at the time this episode happened.  She also tells me that she can feel it when her heart beats rapidly in her chest, and that this time this was not the case as it was only felt in her face and in her teeth.  She does mention that with her history of TIA but at one point she had a right-sided facial droop that oftentimes to this day when she is tired she feels like she has impaired speech though she does not know of other people noticed this.

## 2021-01-28 NOTE — PROGRESS NOTES
Virtual Visit: Established Patient   This visit was conducted via Zoom using secure and encrypted videoconferencing technology. The patient was in a private location in the [facility].    The patient's identity was confirmed and verbal consent was obtained for this virtual visit.    Subjective:   CC:   Chief Complaint   Patient presents with   • [facility]       Lona Ochoa is a 65 y.o. female presenting to [facility].  Her past medical, social, family, and surgical history were reviewed today.  The following issues were addressed at today's visit:    Personal history of malignant melanoma of skin  Patient does do annual skin checks.  She had one last Fall and was clear.     Blurred vision, bilateral  Patient tells me that a couple of days ago she was sitting on the couch watching TV with her  when she experienced an episode of extremely blurry vision and changes in her eyes, followed by pressure behind her eyes, followed by a sensation of being able to feel her heart beat rapidly, not in her chest but in her face and teeth.  She found this episode concerning, however she did not seek medical attention as it resolved within minutes.  She does have a history of having TIA episodes in the past, and wonders if that is what this was.  She has had a cardiac work-up in the past, however this was 10 years ago, and has not been followed by cardiology since.  She denies dizziness or shortness of breath at the time this episode happened.  She also tells me that she can feel it when her heart beats rapidly in her chest, and that this time this was not the case as it was only felt in her face and in her teeth.  She does mention that with her history of TIA but at one point she had a right-sided facial droop that oftentimes to this day when she is tired she feels like she has impaired speech though she does not know of other people noticed this.       ROS   Denies any recent fevers or chills. No nausea or  "vomiting. No chest pains or shortness of breath. +blurred vision episode and pressure in face and teeth.     Allergies   Allergen Reactions   • Sulfa Drugs      2002-02-20;arthritic symptoms  \"due to my lupus\"  2002-02-20;arthritic symptoms       Current medicines (including changes today)  Current Outpatient Medications   Medication Sig Dispense Refill   • ergocalciferol (DRISDOL) 97723 UNIT capsule Take 1 Cap by mouth every 7 days. 12 Cap 0   • Ascorbic Acid (VITAMIN C) 1000 MG Tab Take  by mouth.     • albuterol (PROAIR HFA) 108 (90 Base) MCG/ACT Aero Soln inhalation aerosol Inhale 2 Puffs by mouth every 6 hours as needed for Shortness of Breath. 1 Inhaler 0   • budesonide-formoterol (SYMBICORT) 160-4.5 MCG/ACT Aerosol Inhale 2 Puffs by mouth 2 Times a Day. 1 Inhaler 0   • Multiple Vitamin (MULTI-VITAMIN DAILY PO) Take  by mouth.     • aspirin (ASA) 81 MG CHEW Take 81 mg by mouth every day.       No current facility-administered medications for this visit.        Patient Active Problem List    Diagnosis Date Noted   • Blurred vision, bilateral 01/28/2021   • Family history of colon cancer 11/21/2018   • Vitamin D insufficiency 10/21/2016   • Iliotibial band syndrome of both sides 06/05/2015   • H/O bacterial endocarditis 03/01/2013   • Asthma in adult 12/15/2011   • Family history of early CAD 12/15/2011   • Lupus (systemic lupus erythematosus) (HCC) 12/02/2010   • Personal history of malignant melanoma of skin    • Hx of lumbosacral spine surgery 10/23/2008       Family History   Problem Relation Age of Onset   • Cancer Mother         multiple myloma   • Heart Disease Father 40   • Hyperlipidemia Sister    • Hypertension Sister    • Cancer Sister         st IV colon CA   • Arthritis Maternal Grandmother 85        gout       She  has a past medical history of ASTHMA, ASTHMA (12/15/2011), Cancer (HCC), Chemotherapy, CKD (chronic kidney disease), stage III (12/2/2010), Family history of multiple myeloma " "(6/11/2014), Hypertension (12/2/2010), Lupus (systemic lupus erythematosus) (HCC) (12/2/2010), Melanoma of skin (HCC), Mitral valve prolapse, and Personal history of malignant melanoma of skin (2003). She also has no past medical history of Angina, Backpain, Bronchitis, CAD (coronary artery disease), Dialysis, Fall, Heart murmur, Indigestion, Infectious disease, Jaundice, Liver disease, Other specified symptom associated with female genital organs, Pacemaker, Personal history of venous thrombosis and embolism, Pneumonia, Psychiatric disorder, Psychiatric problem, Rheumatic fever, or Unspecified urinary incontinence.  She  has a past surgical history that includes tonsillectomy; primary c section (1986,1987); other; other orthopedic surgery; and laminotomy.       Objective:   /64 Comment: per patient  Ht 1.689 m (5' 6.5\")   Wt 75.5 kg (166 lb 8 oz) Comment: per patient  BMI 26.47 kg/m²  Respirations visually estimated to be 14.    Physical Exam:  Constitutional: Alert, no distress, well-groomed.  Skin: No rashes in visible areas.  ENMT: Lips pink without lesions. Phonation normal.  Neck: No masses, no thyromegaly. Moves freely without pain.  Respiratory: Unlabored respiratory effort, no cough or audible wheeze  Psych: Alert and oriented x3, normal affect and mood.       Assessment and Plan:   The following treatment plan was discussed:     1. Personal history of malignant melanoma of skin  Patient is up-to-date with annual skin checks.  She will continue this.    2. Well adult exam  3. Encounter for screening mammogram for malignant neoplasm of breast  - MA-SCREENING MAMMO BILAT W/TOMOSYNTHESIS W/CAD; Future  - CBC WITH DIFFERENTIAL; Future  - Comp Metabolic Panel; Future  - LIPID PANEL  - VITAMIN D,25 HYDROXY; Future  - TSH; Future  - BLOOD TYPE ANTIGEN DONOR EA  - HEP C VIRUS ANTIBODY; Future    5. Blurred vision, bilateral  Patient had recent episode of blurred vision, with pressure behind her eyes and " odd sensations of pounding in her face and teeth.  She does have a history of TIAs associated with a right facial droop.  As this is a virtual visit I am not able to perform many neurologic tests or perform an EKG.  Discussed with patient that this does warrant work-up, as I cannot definitively say what may have happened.  Differential diagnosis could include but not limited to atypical migraine, stroke or TIA, or pulsatile tinnitus with nystagmus.  Discussed with patient that if this is to ever happen again, she is to go immediately to the ER.  - MR-BRAIN-W/O; Future  - EC-ECHOCARDIOGRAM COMPLETE W/ CONT; Future  - REFERRAL TO CARDIOLOGY    Follow will be based on results of testing.

## 2021-02-23 ENCOUNTER — HOSPITAL ENCOUNTER (OUTPATIENT)
Dept: LAB | Facility: MEDICAL CENTER | Age: 66
End: 2021-02-23
Attending: NURSE PRACTITIONER
Payer: COMMERCIAL

## 2021-02-23 DIAGNOSIS — Z00.00 WELL ADULT EXAM: ICD-10-CM

## 2021-02-23 LAB
ALBUMIN SERPL BCP-MCNC: 4.1 G/DL (ref 3.2–4.9)
ALBUMIN/GLOB SERPL: 1.3 G/DL
ALP SERPL-CCNC: 83 U/L (ref 30–99)
ALT SERPL-CCNC: 15 U/L (ref 2–50)
ANION GAP SERPL CALC-SCNC: 6 MMOL/L (ref 7–16)
AST SERPL-CCNC: 18 U/L (ref 12–45)
BASOPHILS # BLD AUTO: 1.7 % (ref 0–1.8)
BASOPHILS # BLD: 0.06 K/UL (ref 0–0.12)
BILIRUB SERPL-MCNC: 0.9 MG/DL (ref 0.1–1.5)
BUN SERPL-MCNC: 17 MG/DL (ref 8–22)
CALCIUM SERPL-MCNC: 9.6 MG/DL (ref 8.5–10.5)
CHLORIDE SERPL-SCNC: 106 MMOL/L (ref 96–112)
CHOLEST SERPL-MCNC: 186 MG/DL (ref 100–199)
CO2 SERPL-SCNC: 26 MMOL/L (ref 20–33)
CREAT SERPL-MCNC: 0.69 MG/DL (ref 0.5–1.4)
EOSINOPHIL # BLD AUTO: 0.14 K/UL (ref 0–0.51)
EOSINOPHIL NFR BLD: 4 % (ref 0–6.9)
ERYTHROCYTE [DISTWIDTH] IN BLOOD BY AUTOMATED COUNT: 43.8 FL (ref 35.9–50)
FASTING STATUS PATIENT QL REPORTED: NORMAL
GLOBULIN SER CALC-MCNC: 3.1 G/DL (ref 1.9–3.5)
GLUCOSE SERPL-MCNC: 90 MG/DL (ref 65–99)
HCT VFR BLD AUTO: 39.4 % (ref 37–47)
HDLC SERPL-MCNC: 55 MG/DL
HGB BLD-MCNC: 13 G/DL (ref 12–16)
IMM GRANULOCYTES # BLD AUTO: 0.01 K/UL (ref 0–0.11)
IMM GRANULOCYTES NFR BLD AUTO: 0.3 % (ref 0–0.9)
LDLC SERPL CALC-MCNC: 105 MG/DL
LYMPHOCYTES # BLD AUTO: 1.22 K/UL (ref 1–4.8)
LYMPHOCYTES NFR BLD: 34.6 % (ref 22–41)
MCH RBC QN AUTO: 31 PG (ref 27–33)
MCHC RBC AUTO-ENTMCNC: 33 G/DL (ref 33.6–35)
MCV RBC AUTO: 93.8 FL (ref 81.4–97.8)
MONOCYTES # BLD AUTO: 0.23 K/UL (ref 0–0.85)
MONOCYTES NFR BLD AUTO: 6.5 % (ref 0–13.4)
NEUTROPHILS # BLD AUTO: 1.87 K/UL (ref 2–7.15)
NEUTROPHILS NFR BLD: 52.9 % (ref 44–72)
NRBC # BLD AUTO: 0 K/UL
NRBC BLD-RTO: 0 /100 WBC
PLATELET # BLD AUTO: 247 K/UL (ref 164–446)
PMV BLD AUTO: 10.6 FL (ref 9–12.9)
POTASSIUM SERPL-SCNC: 4.3 MMOL/L (ref 3.6–5.5)
PROT SERPL-MCNC: 7.2 G/DL (ref 6–8.2)
RBC # BLD AUTO: 4.2 M/UL (ref 4.2–5.4)
SODIUM SERPL-SCNC: 138 MMOL/L (ref 135–145)
TRIGL SERPL-MCNC: 129 MG/DL (ref 0–149)
WBC # BLD AUTO: 3.5 K/UL (ref 4.8–10.8)

## 2021-02-23 PROCEDURE — 80053 COMPREHEN METABOLIC PANEL: CPT

## 2021-02-23 PROCEDURE — 84443 ASSAY THYROID STIM HORMONE: CPT

## 2021-02-23 PROCEDURE — 86803 HEPATITIS C AB TEST: CPT

## 2021-02-23 PROCEDURE — 36415 COLL VENOUS BLD VENIPUNCTURE: CPT

## 2021-02-23 PROCEDURE — 85025 COMPLETE CBC W/AUTO DIFF WBC: CPT

## 2021-02-23 PROCEDURE — 80061 LIPID PANEL: CPT

## 2021-02-23 PROCEDURE — 82306 VITAMIN D 25 HYDROXY: CPT

## 2021-02-24 LAB
25(OH)D3 SERPL-MCNC: 39 NG/ML (ref 30–100)
HCV AB SER QL: NORMAL
TSH SERPL DL<=0.005 MIU/L-ACNC: 3.74 UIU/ML (ref 0.38–5.33)

## 2021-03-04 ENCOUNTER — HOSPITAL ENCOUNTER (OUTPATIENT)
Dept: CARDIOLOGY | Facility: MEDICAL CENTER | Age: 66
End: 2021-03-04
Attending: NURSE PRACTITIONER
Payer: COMMERCIAL

## 2021-03-04 DIAGNOSIS — G45.9 TIA (TRANSIENT ISCHEMIC ATTACK): ICD-10-CM

## 2021-03-04 LAB
LV EJECT FRACT  99904: 65
LV EJECT FRACT MOD 2C 99903: 64.38
LV EJECT FRACT MOD 4C 99902: 56.47
LV EJECT FRACT MOD BP 99901: 61.31

## 2021-03-04 PROCEDURE — 93306 TTE W/DOPPLER COMPLETE: CPT

## 2021-03-04 PROCEDURE — 93306 TTE W/DOPPLER COMPLETE: CPT | Mod: 26 | Performed by: INTERNAL MEDICINE

## 2021-03-10 ENCOUNTER — NURSE TRIAGE (OUTPATIENT)
Dept: HEALTH INFORMATION MANAGEMENT | Facility: OTHER | Age: 66
End: 2021-03-10

## 2021-03-10 ENCOUNTER — OFFICE VISIT (OUTPATIENT)
Dept: URGENT CARE | Facility: PHYSICIAN GROUP | Age: 66
End: 2021-03-10
Payer: COMMERCIAL

## 2021-03-10 ENCOUNTER — HOSPITAL ENCOUNTER (OUTPATIENT)
Dept: RADIOLOGY | Facility: MEDICAL CENTER | Age: 66
End: 2021-03-10
Attending: PHYSICIAN ASSISTANT
Payer: COMMERCIAL

## 2021-03-10 VITALS
HEIGHT: 67 IN | BODY MASS INDEX: 26.53 KG/M2 | DIASTOLIC BLOOD PRESSURE: 64 MMHG | TEMPERATURE: 97.8 F | SYSTOLIC BLOOD PRESSURE: 122 MMHG | RESPIRATION RATE: 16 BRPM | OXYGEN SATURATION: 96 % | WEIGHT: 169 LBS | HEART RATE: 69 BPM

## 2021-03-10 DIAGNOSIS — R07.81 RIB PAIN ON LEFT SIDE: ICD-10-CM

## 2021-03-10 LAB — EKG 4674: NORMAL

## 2021-03-10 PROCEDURE — 71101 X-RAY EXAM UNILAT RIBS/CHEST: CPT | Mod: LT

## 2021-03-10 PROCEDURE — 99213 OFFICE O/P EST LOW 20 MIN: CPT | Performed by: PHYSICIAN ASSISTANT

## 2021-03-10 ASSESSMENT — ENCOUNTER SYMPTOMS
SORE THROAT: 0
HEADACHES: 0
SHORTNESS OF BREATH: 0
NAUSEA: 0
COUGH: 0
VOMITING: 0
EYE DISCHARGE: 0
EYE REDNESS: 0
FEVER: 0

## 2021-03-10 ASSESSMENT — FIBROSIS 4 INDEX: FIB4 SCORE: 1.19

## 2021-03-10 ASSESSMENT — PAIN SCALES - GENERAL: PAINLEVEL: 6=MODERATE PAIN

## 2021-03-10 NOTE — PROGRESS NOTES
"Subjective:      ALIDA Ochoa is a 65 y.o. female who presents with Rib Pain (x2-3 weeks, pain on ribs or under ribs, shooting pain )            HPI  This is a new problem.  The patient presents to clinic complaining of left anterolateral rib pain x2-3 weeks.  The patient states approximately 2-3 weeks ago she was hugging her .  The patient states while she was hugging her  she felt her \"pop\".  The patient states the popping sensation to her back felt \"good\".  The patient states she then reached up to hug her  a little harder.  The patient states she developed an immediate pain to her left ribs while hugging her .  The patient reports continued pain to the left ribs underneath the left breast.  The patient reports no associated swelling or bruising to the affected area.  The patient reports increased pain with deep breathing, coughing, laying on her left side, and reaching out with her left arm.  The patient denies associated fever.  She reports no shortness of breath.  No difficulty breathing.  No chest pain.  No cough.  No hemoptysis.  The patient states yesterday she wore an underwire bra, which seems to have aggravated her left rib pain.  The patient has taken OTC Advil for her current symptoms.    PMH:  has a past medical history of ASTHMA, ASTHMA (12/15/2011), Cancer ([facility]), Chemotherapy, CKD (chronic kidney disease), stage III (12/2/2010), Family history of multiple myeloma (6/11/2014), Hypertension (12/2/2010), Lupus (systemic lupus erythematosus) (HCC) (12/2/2010), Melanoma of skin (HCC), Mitral valve prolapse, and Personal history of malignant melanoma of skin (2003). She also has no past medical history of Angina, Backpain, Bronchitis, CAD (coronary artery disease), Dialysis, Fall, Heart murmur, Indigestion, Infectious disease, Jaundice, Liver disease, Other specified symptom associated with female genital organs, Pacemaker, Personal history of venous thrombosis and " "embolism, Pneumonia, Psychiatric disorder, Psychiatric problem, Rheumatic fever, or Unspecified urinary incontinence.  MEDS:   Current Outpatient Medications:   •  ergocalciferol (DRISDOL) 30161 UNIT capsule, Take 1 Cap by mouth every 7 days., Disp: 12 Cap, Rfl: 0  •  Ascorbic Acid (VITAMIN C) 1000 MG Tab, Take  by mouth., Disp: , Rfl:   •  albuterol (PROAIR HFA) 108 (90 Base) MCG/ACT Aero Soln inhalation aerosol, Inhale 2 Puffs by mouth every 6 hours as needed for Shortness of Breath., Disp: 1 Inhaler, Rfl: 0  •  budesonide-formoterol (SYMBICORT) 160-4.5 MCG/ACT Aerosol, Inhale 2 Puffs by mouth 2 Times a Day., Disp: 1 Inhaler, Rfl: 0  •  Multiple Vitamin (MULTI-VITAMIN DAILY PO), Take  by mouth., Disp: , Rfl:   •  aspirin (ASA) 81 MG CHEW, Take 81 mg by mouth every day., Disp: , Rfl:   ALLERGIES:   Allergies   Allergen Reactions   • Sulfa Drugs      2002-02-20;arthritic symptoms  \"due to my lupus\"  2002-02-20;arthritic symptoms     SURGHX:   Past Surgical History:   Procedure Laterality Date   • LAMINOTOMY     • OTHER      RLE skin melanoma excised   • OTHER ORTHOPEDIC SURGERY      spinal    • PRIMARY C SECTION  1986,1987   • TONSILLECTOMY       SOCHX:  reports that she has never smoked. She has never used smokeless tobacco. She reports current alcohol use of about 1.5 oz of alcohol per week. She reports that she does not use drugs.  FH: Family history was reviewed, no pertinent findings to report      Review of Systems   Constitutional: Negative for fever.   HENT: Negative for congestion, ear pain and sore throat.    Eyes: Negative for discharge and redness.   Respiratory: Negative for cough and shortness of breath.    Cardiovascular: Negative for chest pain and leg swelling.   Gastrointestinal: Negative for nausea and vomiting.   Musculoskeletal: Negative for joint pain.   Skin: Negative for rash.   Neurological: Negative for headaches.          Objective:     /64   Pulse 69   Temp 36.6 °C (97.8 °F)   " "Resp 16   Ht 1.689 m (5' 6.5\")   Wt 76.7 kg (169 lb)   SpO2 96%   BMI 26.87 kg/m²      Physical Exam  Constitutional:       General: She is not in acute distress.     Appearance: Normal appearance. She is not ill-appearing.   HENT:      Head: Normocephalic and atraumatic.      Right Ear: Ear canal and external ear normal.      Left Ear: External ear normal.      Nose: Nose normal.      Mouth/Throat:      Mouth: Mucous membranes are moist.      Pharynx: Oropharynx is clear. No posterior oropharyngeal erythema.   Eyes:      Extraocular Movements: Extraocular movements intact.      Conjunctiva/sclera: Conjunctivae normal.   Cardiovascular:      Rate and Rhythm: Normal rate and regular rhythm.      Heart sounds: Normal heart sounds.   Pulmonary:      Effort: Pulmonary effort is normal. No respiratory distress.      Breath sounds: Normal breath sounds. No wheezing.   Chest:      Comments:   Left Ribs:  No tenderness to palpation to the left ribs.  No swelling.  No ecchymosis.  No erythema.  No lesions/rashes.  No palpable deformity.  No palpable crepitus.  Musculoskeletal:         General: Normal range of motion.      Cervical back: Normal range of motion and neck supple.   Skin:     General: Skin is warm and dry.   Neurological:      Mental Status: She is alert and oriented to person, place, and time.            Progress:  Left Rib XR with 1-view CXR:  FINDINGS:  No displaced fracture is seen. No periosteal reaction or bony destruction is seen.     No pneumothorax.     No consolidation.     Stable cardiomediastinal contours. Heart size is upper normal     Splenic calcified granulomas are progressive. They obscure underlying detail     IMPRESSION:  No radiographic evidence of acute or subacute bony injury     Splenic calcifications likely indicating remote granulomatous disease      EKG:   EKG Interpretation   Interpreted by me   Rhythm: normal sinus   Rate: normal   Axis: normal   Ectopy: none   ST Segments: no " acute change   T Waves: no acute change   Q Waves: none   Clinical Impression: no acute changes and normal EKG  This is unchanged from the patient's previous EKG on 8/2018  See scanned EKG in media     Assessment/Plan:          1. Rib pain on left side  - [id number] (WITH 1-VIEW CXR) LEFT; Future  - EKG    The patient's presenting symptoms and physical exam findings are consistent with left-sided rib pain.  The patient states she developed pain to her left ribs after tightly hugging her  attempting to pop her back.  On physical exam, the patient had no tenderness to palpation to the left ribs.  No swelling, ecchymosis, erythema, rashes/lesions, palpable deformity or palpable crepitus was noted.  The patient's lungs were clear to auscultation without wheezing, and her pulse ox was within normal limits.  The patient appears in no acute distress.  The patient's vital signs are stable and within normal limits.  She is afebrile today in clinic.  An x-ray was obtained to further evaluate the patient's left-sided rib pain.  The patient's unilateral left rib x-ray with 1 view chest x-ray showed no radiographic evidence of acute or subacute bony injury.  An EKG was also obtained to further evaluate the patient's current symptoms. The patient's EKG showed normal sinus rhythm with a heart rate of 53. No acute ST segment changes were noted. This is unchanged from the patient's previous EKG on 8/2018.  Based on the patient's presenting symptoms and physical exam findings, it is likely the patient's left rib pain is musculoskeletal in nature.  The patient may have strained the intercostal muscles of the left ribs.  Recommend OTC medications and supportive care for symptomatic management.  Recommend the patient follow-up with her PCP as needed.  Discussed return precautions with the patient, and she verbalized understanding.    Differential diagnoses, supportive care, and indications for immediate follow-up  discussed with patient.   Instructed to return to clinic or nearest emergency department for any change in condition, further concerns, or worsening of symptoms.    OTC Tylenol or Motrin for fever/discomfort.  Apply ice and/or heat to the affected area for symptomatic relief  Monitor for worsening signs and/or symptoms  Follow-up with PCP  Return to clinic or go to the ED if symptoms worsen or fail to improve, or if patient develop worsening/increasing/persistent rib pain, chest pain, shortness of breath, cough, hemoptysis, fever/chills, and/or any concerning symptoms.    Discussed plan with the patient, and she agrees to the above.     I personally reviewed prior external notes and test results pertinent to today's visit.  I have independently reviewed and interpreted all diagnostics ordered during this urgent care visit.     Time spent evaluating this patient was at least 30 minutes and includes preparing for visit, obtaining history, exam and evaluation, ordering labs/tests/procedures/medications, independent interpretation, and counseling/education.    Please note that this dictation was created using voice recognition software. I have made every reasonable attempt to correct obvious errors, but I expect that there may be errors of grammar and possibly content that I did not discover before finalizing the note.     This note was electronically signed by Elaine Zuleta PA-C

## 2021-03-10 NOTE — TELEPHONE ENCOUNTER
"    Reason for Disposition  • Patient wants to be seen    Additional Information  • Negative: Major injury from dangerous force or speed (e.g., MVA, fall > 10 feet or 3 meters)  • Negative: Bullet wound, knife wound or other penetrating object  • Negative: Puncture wound that sounds life-threatening to the triager  • Negative: SEVERE difficulty breathing (e.g., struggling for each breath, speaks in single words)  • Negative: Major bleeding (actively dripping or spurting) that can't be stopped  • Negative: Open wound of the chest with sound of moving air (sucking wound) or visible air bubbles  • Negative: Shock suspected (e.g., cold/pale/clammy skin, too weak to stand, low BP, rapid pulse)  • Negative: Coughing or spitting up blood  • Negative: Bluish (or gray) lips or face  • Negative: Unconscious or was unconscious  • Negative: Sounds like a life-threatening emergency to the triager  • Negative: Chest pain not from an injury  • Negative: Wound looks infected  • Negative: SEVERE chest pain  • Negative: Difficulty breathing and not severe  • Negative: Skin is split open or gaping (or length > 1/2 inch or 12 mm)  • Negative: Bleeding won't stop after 10 minutes of direct pressure (using correct technique)  • Negative: Sounds like a serious injury to the triager  • Negative: Can't take a deep breath but no respiratory distress (e.g., hurts to take a deep breath)  • Negative: Shallow puncture wound  • Negative: Collarbone is painful and difficulty raising arm    Answer Assessment - Initial Assessment Questions  1. MECHANISM: \"How did the injury happen?\"      Hugging  & back popped, searing pain under left breat which felt like a rib  2. ONSET: \"When did the injury happen?\" (Minutes or hours ago)      2 weeks ago  3. LOCATION: \"Where on the chest is the injury located?\"      Under the left breast  4. APPEARANCE: \"What does the injury look like?\"      Nothing out of the ordinary  5. BLEEDING: \"Is there any bleeding " "now? If so, ask: How long has it been bleeding?\"      no  6. SEVERITY: \"Any difficulty with breathing?\"      Sharp intake of breath causes pain  7. SIZE: For cuts, bruises, or swelling, ask: \"How large is it?\" (e.g., inches or centimeters)      NA  8. PAIN: \"Is there pain?\" If so, ask: \"How bad is the pain?\"   (e.g., Scale 1-10; or mild, moderate, severe)      If reach left arm out too far, cough, stretching upper body to right, all causes it to hurt, thought it was getting better until yesterday, wore under wire bra yesterday which seemed to exacerbate problem.        Pain level is @ 6 today.  Been taking Advil.      9. TETANUS: For any breaks in the skin, ask: \"When was the last tetanus booster?\"      NA  10. PREGNANCY: \"Is there any chance you are pregnant?\" \"When was your last menstrual period?\"        NA    Protocols used: CHEST INJURY-A-[place]      "

## 2021-03-15 DIAGNOSIS — Z23 NEED FOR VACCINATION: ICD-10-CM

## 2021-03-19 DIAGNOSIS — M32.9 SYSTEMIC LUPUS ERYTHEMATOSUS, UNSPECIFIED SLE TYPE, UNSPECIFIED ORGAN INVOLVEMENT STATUS (HCC): ICD-10-CM

## 2021-03-27 ENCOUNTER — PATIENT MESSAGE (OUTPATIENT)
Dept: MEDICAL GROUP | Facility: PHYSICIAN GROUP | Age: 66
End: 2021-03-27

## 2021-03-29 NOTE — TELEPHONE ENCOUNTER
From: Lona Ochoa  To: Nurse Practicioner Zenaida Dumont  Sent: 3/27/2021 1:03 PM PDT  Subject: Non-Urgent Medical Question    Hello!  Thank-You for the recent referral to Dr. Hurst. He will be scheduling an appointment with me, after he has reviewed the referral.     I wanted to let you know that I received the Rafa and Rafa vaccine yesterday (March 26) at 2:00 pm, at the [facility]. It was the only vaccine that I was willing to take, after doing some research. I hope that it's not something that I regret later, but I am feeling good, and have had no ill effects. :)  My arm is NOT sore either. :)   Could you please notate that information on my chart?  Thank-You!  Maria Luisa Ochoa

## 2021-07-06 DIAGNOSIS — J45.901 EXACERBATION OF ASTHMA, UNSPECIFIED ASTHMA SEVERITY, UNSPECIFIED WHETHER PERSISTENT: ICD-10-CM

## 2021-07-06 DIAGNOSIS — J45.20 MILD INTERMITTENT ASTHMA WITHOUT COMPLICATION: ICD-10-CM

## 2021-07-06 RX ORDER — ALBUTEROL SULFATE 90 UG/1
2 AEROSOL, METERED RESPIRATORY (INHALATION) EVERY 6 HOURS PRN
Qty: 18 G | Refills: 3 | Status: SHIPPED | OUTPATIENT
Start: 2021-07-06

## 2021-07-06 RX ORDER — BUDESONIDE AND FORMOTEROL FUMARATE DIHYDRATE 160; 4.5 UG/1; UG/1
2 AEROSOL RESPIRATORY (INHALATION) 2 TIMES DAILY
Qty: 10.2 G | Refills: 3 | Status: SHIPPED | OUTPATIENT
Start: 2021-07-06

## 2021-07-12 ENCOUNTER — TELEPHONE (OUTPATIENT)
Dept: MEDICAL GROUP | Facility: PHYSICIAN GROUP | Age: 66
End: 2021-07-12

## 2021-07-12 NOTE — TELEPHONE ENCOUNTER
DOCUMENTATION OF PAR STATUS:    1. Name of Medication & Dose: budesonide-formoterol fumerate 160-4.5mcg/act aresol     2. Name of Prescription Coverage Company & phone #: [employer]    3. Date Prior Auth Submitted: 07/12/2021    4. What information was given to obtain insurance decision? Cover My Meds    5. Prior Auth Status? Pending    6. Patient Notified: yes

## 2021-07-20 NOTE — TELEPHONE ENCOUNTER
FINAL PRIOR AUTHORIZATION STATUS:    1.  Name of Medication & Dose: Symbicort    2. Prior Auth Status: Denied.  Reason: medication not covered/denial scanned into media    3. Action Taken: Pharmacy Notified: no Patient Notified: no

## 2021-09-13 ENCOUNTER — TELEMEDICINE (OUTPATIENT)
Dept: MEDICAL GROUP | Facility: PHYSICIAN GROUP | Age: 66
End: 2021-09-13
Payer: COMMERCIAL

## 2021-09-13 VITALS — WEIGHT: 170 LBS | HEIGHT: 67 IN | BODY MASS INDEX: 26.68 KG/M2

## 2021-09-13 DIAGNOSIS — H10.31 ACUTE BACTERIAL CONJUNCTIVITIS OF RIGHT EYE: Primary | ICD-10-CM

## 2021-09-13 PROCEDURE — 99213 OFFICE O/P EST LOW 20 MIN: CPT | Mod: 95 | Performed by: NURSE PRACTITIONER

## 2021-09-13 RX ORDER — POLYMYXIN B SULFATE AND TRIMETHOPRIM 1; 10000 MG/ML; [USP'U]/ML
1 SOLUTION OPHTHALMIC EVERY 4 HOURS
Qty: 10 ML | Refills: 0 | Status: SHIPPED | OUTPATIENT
Start: 2021-09-13 | End: 2022-04-07

## 2021-09-13 ASSESSMENT — FIBROSIS 4 INDEX: FIB4 SCORE: 1.2

## 2021-09-13 NOTE — PROGRESS NOTES
"Virtual Visit: Established Patient   This visit was conducted via Zoom using secure and encrypted videoconferencing technology. The patient was in a private location in the state of [place].    The patient's identity was confirmed and verbal consent was obtained for this virtual visit.    Subjective:   CC:   Chief Complaint   Patient presents with   • Conjunctivitis     right eye x 4 days       Lona Ochoa is a 66 y.o. female presenting for evaluation and management of:    Acute bacterial conjunctivitis of right eye  Acute condition. Patient states that she has had irritation, discharge and redness to her right eye x 2 days. She is waking up with matting and crusting in this eye as well.  She states that she does wear contact, but she also has glasses to wear as well.  Discussed with patient use of antibiotic eyedrops and advised to wear glasses until symptoms resolve.  Patient verbalized understanding.        ROS   Denies any recent fevers or chills. No nausea or vomiting. No chest pains or shortness of breath. +discharge/matting and irritation of right eye.    Allergies   Allergen Reactions   • Sulfa Drugs      2002-02-20;arthritic symptoms  \"due to my lupus\"  2002-02-20;arthritic symptoms       Current medicines (including changes today)  Current Outpatient Medications   Medication Sig Dispense Refill   • polymixin-trimethoprim (POLYTRIM) 62473-7.1 UNIT/ML-% Solution Administer 1 Drop into the right eye every 4 hours. 10 mL 0   • albuterol (PROAIR HFA) 108 (90 Base) MCG/ACT Aero Soln inhalation aerosol Inhale 2 Puffs every 6 hours as needed for Shortness of Breath. 18 g 3   • budesonide-formoterol (SYMBICORT) 160-4.5 MCG/ACT Aerosol Inhale 2 Puffs 2 times a day. 10.2 g 3   • Ascorbic Acid (VITAMIN C) 1000 MG Tab Take  by mouth.     • Multiple Vitamin (MULTI-VITAMIN DAILY PO) Take  by mouth.     • aspirin (ASA) 81 MG CHEW Take 81 mg by mouth every day.     • ergocalciferol (DRISDOL) 73352 UNIT capsule Take 1 " Cap by mouth every 7 days. 12 Cap 0     No current facility-administered medications for this visit.       Patient Active Problem List    Diagnosis Date Noted   • Acute bacterial conjunctivitis of right eye 09/13/2021   • Blurred vision, bilateral 01/28/2021   • Family history of colon cancer 11/21/2018   • Vitamin D insufficiency 10/21/2016   • Iliotibial band syndrome of both sides 06/05/2015   • H/O bacterial endocarditis 03/01/2013   • Asthma in adult 12/15/2011   • Family history of early CAD 12/15/2011   • Hx of lumbosacral spine surgery 10/23/2008   • History of malignant melanoma of skin 02/07/2008   • SLE (systemic lupus erythematosus) ([facility]) 03/13/2003       Family History   Problem Relation Age of Onset   • Cancer Mother         multiple myloma   • Heart Disease Father 40   • Hyperlipidemia Sister    • Hypertension Sister    • Cancer Sister         st IV colon CA   • Arthritis Maternal Grandmother 85        gout       She  has a past medical history of Acute bacterial conjunctivitis of right eye (9/13/2021), ASTHMA, ASTHMA (12/15/2011), Cancer ([facility]), Chemotherapy, CKD (chronic kidney disease), stage III ([facility]) (12/2/2010), Family history of multiple myeloma (6/11/2014), Hypertension (12/2/2010), Lupus (systemic lupus erythematosus) ([facility]) (12/2/2010), Melanoma of skin (HCC), Mitral valve prolapse, and Personal history of malignant melanoma of skin (2003). She also has no past medical history of Angina, Backpain, Bronchitis, CAD (coronary artery disease), Dialysis, Fall, Heart murmur, Indigestion, Jaundice, Liver disease, Other specified symptom associated with female genital organs, Pacemaker, Personal history of venous thrombosis and embolism, Pneumonia, Psychiatric disorder, Psychiatric problem, Rheumatic fever, or Unspecified urinary incontinence.  She  has a past surgical history that includes tonsillectomy; primary c section (1986,1987); other; other orthopedic surgery; and laminotomy.       Objective:  "  Ht 1.689 m (5' 6.5\") Comment: pt states  Wt 77.1 kg (170 lb) Comment: pt states  LMP 02/15/2002   BMI 27.03 kg/m²   Respirations visually estimated to be 14.     Physical Exam:  Constitutional: Alert, no distress, well-groomed.  Skin: No rashes in visible areas.  HEENT:  Deferred - cannot see right eye well over camera.   ENMT: Lips pink without lesions. Phonation normal.  Neck: No masses, no thyromegaly. Moves freely without pain.  Respiratory: Unlabored respiratory effort, no cough or audible wheeze  Psych: Alert and oriented x3, normal affect and mood.       Assessment and Plan:   The following treatment plan was discussed:     1. Acute bacterial conjunctivitis of right eye  Acute condition.  Irritation, discharge and matting, right eye x 2 days.   - polymixin-trimethoprim (POLYTRIM) 47334-4.1 UNIT/ML-% Solution; Administer 1 Drop into the right eye every 4 hours.  Dispense: 10 mL; Refill: 0      Follow-up: Return if symptoms worsen or fail to improve.          "

## 2021-09-13 NOTE — ASSESSMENT & PLAN NOTE
Acute condition. Patient states that she has had irritation, discharge and redness to her right eye x 2 days. She is waking up with matting and crusting in this eye as well.  She states that she does wear contact, but she also has glasses to wear as well.  Discussed with patient use of antibiotic eyedrops and advised to wear glasses until symptoms resolve.  Patient verbalized understanding.

## 2021-12-21 ENCOUNTER — HOSPITAL ENCOUNTER (OUTPATIENT)
Dept: RADIOLOGY | Facility: MEDICAL CENTER | Age: 66
End: 2021-12-21
Attending: NURSE PRACTITIONER
Payer: COMMERCIAL

## 2021-12-21 DIAGNOSIS — Z12.31 ENCOUNTER FOR SCREENING MAMMOGRAM FOR MALIGNANT NEOPLASM OF BREAST: ICD-10-CM

## 2021-12-21 PROCEDURE — 77063 BREAST TOMOSYNTHESIS BI: CPT

## 2022-04-05 ENCOUNTER — TELEPHONE (OUTPATIENT)
Dept: MEDICAL GROUP | Facility: PHYSICIAN GROUP | Age: 67
End: 2022-04-05

## 2022-04-06 NOTE — TELEPHONE ENCOUNTER
Pt called and LVM stating that she has a dizzy spell today and if Zenaida Julia would like to see her sooner. Pt has an appointment with you on 4/7/22 at 12:40pm.

## 2022-04-07 ENCOUNTER — OFFICE VISIT (OUTPATIENT)
Dept: MEDICAL GROUP | Facility: PHYSICIAN GROUP | Age: 67
End: 2022-04-07
Payer: COMMERCIAL

## 2022-04-07 VITALS
HEIGHT: 67 IN | TEMPERATURE: 97.2 F | SYSTOLIC BLOOD PRESSURE: 126 MMHG | BODY MASS INDEX: 26.26 KG/M2 | DIASTOLIC BLOOD PRESSURE: 72 MMHG | WEIGHT: 167.3 LBS | RESPIRATION RATE: 16 BRPM | HEART RATE: 73 BPM | OXYGEN SATURATION: 96 %

## 2022-04-07 DIAGNOSIS — Z13.228 SCREENING FOR ENDOCRINE, METABOLIC AND IMMUNITY DISORDER: ICD-10-CM

## 2022-04-07 DIAGNOSIS — E55.9 VITAMIN D INSUFFICIENCY: ICD-10-CM

## 2022-04-07 DIAGNOSIS — Z13.29 SCREENING FOR ENDOCRINE, METABOLIC AND IMMUNITY DISORDER: ICD-10-CM

## 2022-04-07 DIAGNOSIS — Z00.00 WELL ADULT EXAM: ICD-10-CM

## 2022-04-07 DIAGNOSIS — R42 DIZZINESS: Primary | ICD-10-CM

## 2022-04-07 DIAGNOSIS — F40.240 CLAUSTROPHOBIA: ICD-10-CM

## 2022-04-07 DIAGNOSIS — Z13.0 SCREENING FOR ENDOCRINE, METABOLIC AND IMMUNITY DISORDER: ICD-10-CM

## 2022-04-07 PROCEDURE — 99214 OFFICE O/P EST MOD 30 MIN: CPT | Performed by: NURSE PRACTITIONER

## 2022-04-07 PROCEDURE — 93000 ELECTROCARDIOGRAM COMPLETE: CPT | Performed by: NURSE PRACTITIONER

## 2022-04-07 RX ORDER — ALPRAZOLAM 0.5 MG/1
0.5 TABLET ORAL
Qty: 2 TABLET | Refills: 0 | Status: SHIPPED | OUTPATIENT
Start: 2022-04-07 | End: 2022-04-08

## 2022-04-07 RX ORDER — VITAMIN B COMPLEX
1000 TABLET ORAL DAILY
COMMUNITY
End: 2022-04-11

## 2022-04-07 ASSESSMENT — PATIENT HEALTH QUESTIONNAIRE - PHQ9: CLINICAL INTERPRETATION OF PHQ2 SCORE: 0

## 2022-04-07 ASSESSMENT — FIBROSIS 4 INDEX: FIB4 SCORE: 1.2

## 2022-04-07 NOTE — ASSESSMENT & PLAN NOTE
"Acute condition.  Patient reports that she has been having brief episodes of dizziness.  She states that approximately one month ago she had a visual disturbance while watching TV where her eyes \"went goofy\" and things became visually distorted and she was dizzy. She saw white flashes in her vision at that time as well.  She did see her optometrist after this episode who did evaluate her and found her to have a normal eye exam.  She does have an appointment with the ophthalmologist next month for further evaluation of her eyes.  She describes the episodes as room spinning with the need to sit down. She does not feel like she will pass out, though she prepares for this.  This past Saturday, she had an episode where she was taking water out to the yard and when she got outside, she got dizzy.  She went inside and was dizzy enough that she sat down on the couch and had to call her  to come home from work.  This episode lasted until he got home and then dissipated thereafter.  Then, this past Tuesday she was grocery shopping and she got dizzy and hung onto the cart while the episode passed, and then she was okay.  Patient also notes a chirp in her right ear. She actually thought there was a bug in her ear and tried to flush it out.  The chirp has been diminishing over the past couple of days.  Of note, patient did have a similar episode while watching TV last year, of unclear etiology, but this had not recurred since.  She had a negative EKG and negative echocardiogram as well as normal labs at that time.      Discussed with patient that since these episodes are ongoing, would recommend EKG in office today.  EKG in the office today was completely normal, which is reassuring.  Discussed further workup with labs as well as an MRI of her brain today. Patient is in agreement with this plan.  Advised patient that if she were to develop any further episodes, or the episodes are accompanied with chest pain, shortness of " breath, headache or neurological symptoms she should go to the ER.  She verbalized understanding.

## 2022-04-11 ENCOUNTER — HOSPITAL ENCOUNTER (OUTPATIENT)
Dept: LAB | Facility: MEDICAL CENTER | Age: 67
End: 2022-04-11
Attending: NURSE PRACTITIONER
Payer: COMMERCIAL

## 2022-04-11 DIAGNOSIS — Z13.228 SCREENING FOR ENDOCRINE, METABOLIC AND IMMUNITY DISORDER: ICD-10-CM

## 2022-04-11 DIAGNOSIS — E55.9 VITAMIN D INSUFFICIENCY: ICD-10-CM

## 2022-04-11 DIAGNOSIS — Z13.0 SCREENING FOR ENDOCRINE, METABOLIC AND IMMUNITY DISORDER: ICD-10-CM

## 2022-04-11 DIAGNOSIS — R42 DIZZINESS: ICD-10-CM

## 2022-04-11 DIAGNOSIS — Z13.29 SCREENING FOR ENDOCRINE, METABOLIC AND IMMUNITY DISORDER: ICD-10-CM

## 2022-04-11 DIAGNOSIS — Z00.00 WELL ADULT EXAM: ICD-10-CM

## 2022-04-11 PROBLEM — F40.240 CLAUSTROPHOBIA: Chronic | Status: ACTIVE | Noted: 2022-04-07

## 2022-04-11 LAB
25(OH)D3 SERPL-MCNC: 29 NG/ML (ref 30–100)
ALBUMIN SERPL BCP-MCNC: 4.2 G/DL (ref 3.2–4.9)
ALBUMIN/GLOB SERPL: 1.4 G/DL
ALP SERPL-CCNC: 79 U/L (ref 30–99)
ALT SERPL-CCNC: 14 U/L (ref 2–50)
ANION GAP SERPL CALC-SCNC: 10 MMOL/L (ref 7–16)
AST SERPL-CCNC: 15 U/L (ref 12–45)
BASOPHILS # BLD AUTO: 1.9 % (ref 0–1.8)
BASOPHILS # BLD: 0.06 K/UL (ref 0–0.12)
BILIRUB SERPL-MCNC: 0.9 MG/DL (ref 0.1–1.5)
BUN SERPL-MCNC: 13 MG/DL (ref 8–22)
CALCIUM SERPL-MCNC: 9.2 MG/DL (ref 8.5–10.5)
CHLORIDE SERPL-SCNC: 110 MMOL/L (ref 96–112)
CHOLEST SERPL-MCNC: 173 MG/DL (ref 100–199)
CO2 SERPL-SCNC: 24 MMOL/L (ref 20–33)
CREAT SERPL-MCNC: 0.63 MG/DL (ref 0.5–1.4)
EOSINOPHIL # BLD AUTO: 0.07 K/UL (ref 0–0.51)
EOSINOPHIL NFR BLD: 2.3 % (ref 0–6.9)
ERYTHROCYTE [DISTWIDTH] IN BLOOD BY AUTOMATED COUNT: 43.2 FL (ref 35.9–50)
EST. AVERAGE GLUCOSE BLD GHB EST-MCNC: 114 MG/DL
FASTING STATUS PATIENT QL REPORTED: NORMAL
FERRITIN SERPL-MCNC: 205 NG/ML (ref 10–291)
GFR SERPLBLD CREATININE-BSD FMLA CKD-EPI: 98 ML/MIN/1.73 M 2
GLOBULIN SER CALC-MCNC: 2.9 G/DL (ref 1.9–3.5)
GLUCOSE SERPL-MCNC: 93 MG/DL (ref 65–99)
HBA1C MFR BLD: 5.6 % (ref 4–5.6)
HCT VFR BLD AUTO: 37.6 % (ref 37–47)
HDLC SERPL-MCNC: 56 MG/DL
HGB BLD-MCNC: 12.5 G/DL (ref 12–16)
IMM GRANULOCYTES # BLD AUTO: 0.02 K/UL (ref 0–0.11)
IMM GRANULOCYTES NFR BLD AUTO: 0.6 % (ref 0–0.9)
IRON SATN MFR SERPL: 51 % (ref 15–55)
IRON SERPL-MCNC: 132 UG/DL (ref 40–170)
LDLC SERPL CALC-MCNC: 95 MG/DL
LYMPHOCYTES # BLD AUTO: 1.07 K/UL (ref 1–4.8)
LYMPHOCYTES NFR BLD: 34.5 % (ref 22–41)
MCH RBC QN AUTO: 30.9 PG (ref 27–33)
MCHC RBC AUTO-ENTMCNC: 33.2 G/DL (ref 33.6–35)
MCV RBC AUTO: 93.1 FL (ref 81.4–97.8)
MONOCYTES # BLD AUTO: 0.26 K/UL (ref 0–0.85)
MONOCYTES NFR BLD AUTO: 8.4 % (ref 0–13.4)
NEUTROPHILS # BLD AUTO: 1.62 K/UL (ref 2–7.15)
NEUTROPHILS NFR BLD: 52.3 % (ref 44–72)
NRBC # BLD AUTO: 0 K/UL
NRBC BLD-RTO: 0 /100 WBC
PLATELET # BLD AUTO: 241 K/UL (ref 164–446)
PMV BLD AUTO: 11.1 FL (ref 9–12.9)
POTASSIUM SERPL-SCNC: 4.3 MMOL/L (ref 3.6–5.5)
PROT SERPL-MCNC: 7.1 G/DL (ref 6–8.2)
RBC # BLD AUTO: 4.04 M/UL (ref 4.2–5.4)
SODIUM SERPL-SCNC: 144 MMOL/L (ref 135–145)
TIBC SERPL-MCNC: 259 UG/DL (ref 250–450)
TRIGL SERPL-MCNC: 112 MG/DL (ref 0–149)
TSH SERPL DL<=0.005 MIU/L-ACNC: 4.78 UIU/ML (ref 0.38–5.33)
UIBC SERPL-MCNC: 127 UG/DL (ref 110–370)
WBC # BLD AUTO: 3.1 K/UL (ref 4.8–10.8)

## 2022-04-11 PROCEDURE — 36415 COLL VENOUS BLD VENIPUNCTURE: CPT

## 2022-04-11 PROCEDURE — 80053 COMPREHEN METABOLIC PANEL: CPT

## 2022-04-11 PROCEDURE — 83540 ASSAY OF IRON: CPT

## 2022-04-11 PROCEDURE — 82728 ASSAY OF FERRITIN: CPT

## 2022-04-11 PROCEDURE — 83550 IRON BINDING TEST: CPT

## 2022-04-11 PROCEDURE — 83036 HEMOGLOBIN GLYCOSYLATED A1C: CPT

## 2022-04-11 PROCEDURE — 85025 COMPLETE CBC W/AUTO DIFF WBC: CPT

## 2022-04-11 PROCEDURE — 80061 LIPID PANEL: CPT

## 2022-04-11 PROCEDURE — 84443 ASSAY THYROID STIM HORMONE: CPT

## 2022-04-11 PROCEDURE — 82306 VITAMIN D 25 HYDROXY: CPT

## 2022-04-11 RX ORDER — MULTIVIT-MIN/IRON/FOLIC ACID/K 18-600-40
2000 CAPSULE ORAL DAILY
COMMUNITY
End: 2022-10-04

## 2022-04-12 NOTE — ASSESSMENT & PLAN NOTE
Acute condition.  Patient is claustrophobic with MRI machine.  Limited supply of alprazolam 0.5 mg given to patient to take 0.5 mg 1 hour prior to MRI appointment, and may repeat 0.5 mg 30 minutes prior still anxious.

## 2022-04-12 NOTE — ASSESSMENT & PLAN NOTE
Chronic condition, stable.  Patient takes vitamin D supplementation daily at 2000 units/day.  She is due for updated labs today, and this was ordered for her.

## 2022-04-12 NOTE — PROGRESS NOTES
Subjective:     CC: The primary encounter diagnosis was Dizziness. Diagnoses of Claustrophobia, Vitamin D insufficiency, Well adult exam, and Screening for endocrine, metabolic and immunity disorder were also pertinent to this visit.      HPI:   Lona is an established patient of mine here for follow-up.  We discussed the following problems:    1. Dizziness  Acute condition.  Patient is here for evaluation today.    2. Claustrophobia  Acute condition.  Patient is claustrophobic with MRI.  Limited supply of alprazolam 0.5 mg given to patient today for MRI.    3. Vitamin D insufficiency  Chronic condition, stable.  Patient is here for follow up today.    4. Well adult exam  Labs ordered in anticipation of well adult exam.    5. Screening for endocrine, metabolic and immunity disorder  Labs ordered for screening for endocrine disorder.        Past Medical History:   Diagnosis Date   • Acute bacterial conjunctivitis of right eye 9/13/2021   • ASTHMA    • ASTHMA 12/15/2011   • Cancer (HCC)     skin melanoma RLE   • Chemotherapy    • CKD (chronic kidney disease), stage III (HCC) 12/2/2010   • Family history of multiple myeloma 6/11/2014   • Hypertension 12/2/2010   • Lupus (systemic lupus erythematosus) (HCC) 12/2/2010   • Melanoma of skin (HCC)    • Mitral valve prolapse    • Personal history of malignant melanoma of skin 2003    right leg       Social History     Tobacco Use   • Smoking status: Never Smoker   • Smokeless tobacco: Never Used   • Tobacco comment: denies   Vaping Use   • Vaping Use: Never used   Substance Use Topics   • Alcohol use: Not Currently     Alcohol/week: 1.5 oz     Types: 3 Glasses of wine per week     Comment: holidays   • Drug use: Yes     Types: Marijuana, Oral     Comment: gummies for sleep       Current Outpatient Medications Ordered in Epic   Medication Sig Dispense Refill   • albuterol (PROAIR HFA) 108 (90 Base) MCG/ACT Aero Soln inhalation aerosol Inhale 2 Puffs every 6 hours as needed  "for Shortness of Breath. 18 g 3   • budesonide-formoterol (SYMBICORT) 160-4.5 MCG/ACT Aerosol Inhale 2 Puffs 2 times a day. 10.2 g 3   • Ascorbic Acid (VITAMIN C) 1000 MG Tab Take  by mouth.     • Cholecalciferol (VITAMIN D) 50 MCG (2000 UT) Cap Take 2,000 Units by mouth every day.       No current [facility]-ordered facility-administered medications on file.       Allergies:  Sulfa drugs    Health Maintenance: Deferred    ROS:  Gen: no fevers/chills, no changes in weight  Eyes: no changes in vision  ENT: no sore throat, no hearing loss, no bloody nose  Pulm: no sob, no cough  CV: no chest pain, no palpitations  GI: no nausea/vomiting, no diarrhea  : no dysuria  MSk: no myalgias  Skin: no rash  Neuro: no headaches, no numbness/tingling,  +dizziness  Heme/Lymph: no easy bruising      Objective:       Exam:  /72 (BP Location: Right arm, Patient Position: Sitting, BP Cuff Size: Adult)   Pulse 73   Temp 36.2 °C (97.2 °F) (Temporal)   Resp 16   Ht 1.689 m (5' 6.5\")   Wt 75.9 kg (167 lb 4.8 oz)   LMP 02/15/2002   SpO2 96%   Breastfeeding No   BMI 26.60 kg/m²  Body mass index is 26.6 kg/m².    Gen: Alert and oriented, No apparent distress.  Neck: Neck is supple without lymphadenopathy.  No carotid bruits.  Lungs: Normal effort, CTA bilaterally, no wheezes, rhonchi, or rales  CV: Regular rate and rhythm. No murmurs, rubs, or gallops.  Ext: No clubbing, cyanosis, edema.    Labs: Dated: None    EKG Interpretation   Ordered and interpreted by LANDON Elias  Rhythm: normal sinus   Rate: 61 normal   DC:  .14  QRS:  .10  QT:  .42  Conduction: normal   ST Segments: no acute change   T Waves: no acute change   Q Waves: none   Clinical Impression: no acute changes and normal EKG      Assessment & Plan:     66 y.o. female with the following -     Dizziness  Acute condition.  Patient reports that she has been having brief episodes of dizziness.  She states that approximately one month ago she had a visual disturbance " "while watching TV where her eyes \"went goofy\" and things became visually distorted and she was dizzy. She saw white flashes in her vision at that time as well.  She did see her optometrist after this episode who did evaluate her and found her to have a normal eye exam.  She does have an appointment with the ophthalmologist next month for further evaluation of her eyes.  She describes the episodes as room spinning with the need to sit down. She does not feel like she will pass out, though she prepares for this.  This past Saturday, she had an episode where she was taking water out to the yard and when she got outside, she got dizzy.  She went inside and was dizzy enough that she sat down on the couch and had to call her  to come home from work.  This episode lasted until he got home and then dissipated thereafter.  Then, this past Tuesday she was grocery shopping and she got dizzy and hung onto the cart while the episode passed, and then she was okay.  Patient also notes a chirp in her right ear. She actually thought there was a bug in her ear and tried to flush it out.  The chirp has been diminishing over the past couple of days.  Of note, patient did have a similar episode while watching TV last year, of unclear etiology, but this had not recurred since.  She had a negative EKG and negative echocardiogram as well as normal labs at that time.      Discussed with patient that since these episodes are ongoing, would recommend EKG in office today.  EKG in the office today was completely normal, which is reassuring.  Discussed further workup with labs as well as an MRI of her brain today. Patient is in agreement with this plan.  Advised patient that if she were to develop any further episodes, or the episodes are accompanied with chest pain, shortness of breath, headache or neurological symptoms she should go to the ER.  She verbalized understanding.           Vitamin D insufficiency  Chronic condition, stable.  " Patient takes vitamin D supplementation daily at 2000 units/day.  She is due for updated labs today, and this was ordered for her.    Claustrophobia  Acute condition.  Patient is claustrophobic with MRI machine.  Limited supply of alprazolam 0.5 mg given to patient to take 0.5 mg 1 hour prior to MRI appointment, and may repeat 0.5 mg 30 minutes prior still anxious.      Orders Placed This Encounter   • MR-BRAIN-W/O   • CBC WITH DIFFERENTIAL   • Comp Metabolic Panel   • HEMOGLOBIN A1C   • IRON/TOTAL IRON BIND   • Lipid Profile   • VITAMIN D,25 HYDROXY   • FERRITIN   • TSH WITH REFLEX TO FT4   • EKG - Clinic Performed   • DISCONTD: vitamin D3 (CHOLECALCIFEROL) 1000 Unit (25 mcg) Tab   • ALPRAZolam (XANAX) 0.5 MG Tab   • Cholecalciferol (VITAMIN D) 50 MCG (2000 UT) Cap          Return in about 2 weeks (around 4/21/2022).    I have placed the below orders and discussed them with an approved delegating provider.  The MA is performing the below orders under the direction of Martine Mcduffie MD.    Please note that this dictation was created using voice recognition software. I have made every reasonable attempt to correct obvious errors, but I expect that there are errors of grammar and possibly content that I did not discover before finalizing the note.

## 2022-05-04 ENCOUNTER — HOSPITAL ENCOUNTER (OUTPATIENT)
Dept: RADIOLOGY | Facility: MEDICAL CENTER | Age: 67
End: 2022-05-04
Attending: NURSE PRACTITIONER
Payer: MEDICARE

## 2022-05-04 DIAGNOSIS — R42 DIZZINESS: ICD-10-CM

## 2022-05-04 PROCEDURE — 70551 MRI BRAIN STEM W/O DYE: CPT | Mod: MG

## 2022-05-31 ENCOUNTER — TELEPHONE (OUTPATIENT)
Dept: CARDIOLOGY | Facility: MEDICAL CENTER | Age: 67
End: 2022-05-31
Payer: MEDICARE

## 2022-05-31 ENCOUNTER — APPOINTMENT (OUTPATIENT)
Dept: RADIOLOGY | Facility: MEDICAL CENTER | Age: 67
End: 2022-05-31
Payer: MEDICARE

## 2022-05-31 ENCOUNTER — TELEPHONE (OUTPATIENT)
Dept: MEDICAL GROUP | Facility: PHYSICIAN GROUP | Age: 67
End: 2022-05-31
Payer: MEDICARE

## 2022-05-31 ENCOUNTER — HOSPITAL ENCOUNTER (EMERGENCY)
Facility: MEDICAL CENTER | Age: 67
End: 2022-05-31
Attending: EMERGENCY MEDICINE
Payer: MEDICARE

## 2022-05-31 ENCOUNTER — APPOINTMENT (OUTPATIENT)
Dept: RADIOLOGY | Facility: MEDICAL CENTER | Age: 67
End: 2022-05-31
Attending: EMERGENCY MEDICINE
Payer: MEDICARE

## 2022-05-31 VITALS
HEIGHT: 66 IN | TEMPERATURE: 97.6 F | BODY MASS INDEX: 27.57 KG/M2 | WEIGHT: 171.52 LBS | OXYGEN SATURATION: 95 % | RESPIRATION RATE: 16 BRPM | HEART RATE: 87 BPM | SYSTOLIC BLOOD PRESSURE: 156 MMHG | DIASTOLIC BLOOD PRESSURE: 72 MMHG

## 2022-05-31 DIAGNOSIS — R07.9 CHEST PAIN, UNSPECIFIED TYPE: ICD-10-CM

## 2022-05-31 LAB
ALBUMIN SERPL BCP-MCNC: 4.2 G/DL (ref 3.2–4.9)
ALBUMIN/GLOB SERPL: 1.3 G/DL
ALP SERPL-CCNC: 103 U/L (ref 30–99)
ALT SERPL-CCNC: 12 U/L (ref 2–50)
ANION GAP SERPL CALC-SCNC: 9 MMOL/L (ref 7–16)
AST SERPL-CCNC: 15 U/L (ref 12–45)
BASOPHILS # BLD AUTO: 1.1 % (ref 0–1.8)
BASOPHILS # BLD: 0.05 K/UL (ref 0–0.12)
BILIRUB SERPL-MCNC: 0.7 MG/DL (ref 0.1–1.5)
BUN SERPL-MCNC: 21 MG/DL (ref 8–22)
CALCIUM SERPL-MCNC: 9.6 MG/DL (ref 8.4–10.2)
CHLORIDE SERPL-SCNC: 106 MMOL/L (ref 96–112)
CO2 SERPL-SCNC: 25 MMOL/L (ref 20–33)
CREAT SERPL-MCNC: 0.76 MG/DL (ref 0.5–1.4)
EKG IMPRESSION: NORMAL
EOSINOPHIL # BLD AUTO: 0.08 K/UL (ref 0–0.51)
EOSINOPHIL NFR BLD: 1.7 % (ref 0–6.9)
ERYTHROCYTE [DISTWIDTH] IN BLOOD BY AUTOMATED COUNT: 41.3 FL (ref 35.9–50)
GFR SERPLBLD CREATININE-BSD FMLA CKD-EPI: 87 ML/MIN/1.73 M 2
GLOBULIN SER CALC-MCNC: 3.2 G/DL (ref 1.9–3.5)
GLUCOSE SERPL-MCNC: 94 MG/DL (ref 65–99)
HCT VFR BLD AUTO: 38.2 % (ref 37–47)
HGB BLD-MCNC: 12.8 G/DL (ref 12–16)
IMM GRANULOCYTES # BLD AUTO: 0.01 K/UL (ref 0–0.11)
IMM GRANULOCYTES NFR BLD AUTO: 0.2 % (ref 0–0.9)
LYMPHOCYTES # BLD AUTO: 1.8 K/UL (ref 1–4.8)
LYMPHOCYTES NFR BLD: 38.5 % (ref 22–41)
MCH RBC QN AUTO: 30.8 PG (ref 27–33)
MCHC RBC AUTO-ENTMCNC: 33.5 G/DL (ref 33.6–35)
MCV RBC AUTO: 91.8 FL (ref 81.4–97.8)
MONOCYTES # BLD AUTO: 0.27 K/UL (ref 0–0.85)
MONOCYTES NFR BLD AUTO: 5.8 % (ref 0–13.4)
NEUTROPHILS # BLD AUTO: 2.47 K/UL (ref 2–7.15)
NEUTROPHILS NFR BLD: 52.7 % (ref 44–72)
NRBC # BLD AUTO: 0 K/UL
NRBC BLD-RTO: 0 /100 WBC
PLATELET # BLD AUTO: 253 K/UL (ref 164–446)
PMV BLD AUTO: 10 FL (ref 9–12.9)
POTASSIUM SERPL-SCNC: 3.9 MMOL/L (ref 3.6–5.5)
PROT SERPL-MCNC: 7.4 G/DL (ref 6–8.2)
RBC # BLD AUTO: 4.16 M/UL (ref 4.2–5.4)
SODIUM SERPL-SCNC: 140 MMOL/L (ref 135–145)
TROPONIN T SERPL-MCNC: <6 NG/L (ref 6–19)
WBC # BLD AUTO: 4.7 K/UL (ref 4.8–10.8)

## 2022-05-31 PROCEDURE — 84484 ASSAY OF TROPONIN QUANT: CPT

## 2022-05-31 PROCEDURE — 71045 X-RAY EXAM CHEST 1 VIEW: CPT

## 2022-05-31 PROCEDURE — 93005 ELECTROCARDIOGRAM TRACING: CPT

## 2022-05-31 PROCEDURE — 99284 EMERGENCY DEPT VISIT MOD MDM: CPT

## 2022-05-31 PROCEDURE — 36415 COLL VENOUS BLD VENIPUNCTURE: CPT

## 2022-05-31 PROCEDURE — 85025 COMPLETE CBC W/AUTO DIFF WBC: CPT

## 2022-05-31 PROCEDURE — 80053 COMPREHEN METABOLIC PANEL: CPT

## 2022-05-31 PROCEDURE — 93005 ELECTROCARDIOGRAM TRACING: CPT | Performed by: EMERGENCY MEDICINE

## 2022-05-31 ASSESSMENT — FIBROSIS 4 INDEX: FIB4 SCORE: 1.06

## 2022-05-31 ASSESSMENT — PAIN DESCRIPTION - PAIN TYPE: TYPE: ACUTE PAIN

## 2022-05-31 NOTE — TELEPHONE ENCOUNTER
ADD    Caller: Maria Luisa Ochoa    Reported Symptoms: Pain in the right side of chest. Does have a mitral valve prolapse. Advised to go to ER as well as her PCP stated to go to ER. Patient stated she would with any new chest pain.    Recent Blood Pressure/Heart Rate Reading: N/A    Callback Number: [phone]

## 2022-05-31 NOTE — TELEPHONE ENCOUNTER
Caller Name: Lona Ochoa  Pt Number: [phone] (home)     Pt called stating that she had woke up this morning with chest pain and that she had called and left a message to her cardiologist but no one has call her back.    Pt also has stated that at this moment she was feeling ok, but that this is the third episode of chest pain in a week plus she had and episode of  arm pain as well.     Pt was asking for advise on what to do, she was advised to go to the emergency department.

## 2022-06-01 NOTE — TELEPHONE ENCOUNTER
Called Maria Luisa [phone]  Maria Luisa has NOT been seen by any provider here at [facility]. Advised Maria Luisa to seek urgent care by going to the nearest ED. Maria Luisa verbalized understanding.          To VR as ADD for today. FYI only.

## 2022-06-01 NOTE — ED PROVIDER NOTES
ED Provider Note    Scribed for Damon Briones M.D. by Lissette Cavazos. 5/31/2022,  8:19 PM.    CHIEF COMPLAINT  Chief Complaint   Patient presents with    Chest Pain       HPI  Lona Ochoa is a 66 y.o. female who presents to the Emergency Department for evaluation of intermittent right sided chest pain onset 2:30AM prior to arrival. Patient states that she hasn't felt the pain on her left side. She states that she had a similar episode last week, but it went away. Patient also reports that about 2 days ago she had similar right arm pain. As well as, she reports that last year she had a similar feeling of back pain; however, she states that she had a cardiac ultrasound echo done that came back normal at the time. Patient notes a history of mitral valve prolapse. She adds that she hasn't felt pain since her episode this morning. She notes that her episodes happen at night. She admits to associated symptoms of dizziness, but denies fever or chills. No alleviating factors were reported. She denies taking any medications. Patient also denies any trauma or fall that could of have caused this.     REVIEW OF SYSTEMS  See HPI for further details. All other systems are negative.     PAST MEDICAL HISTORY   has a past medical history of Acute bacterial conjunctivitis of right eye (9/13/2021), ASTHMA, ASTHMA (12/15/2011), Cancer ([facility]), Chemotherapy, CKD (chronic kidney disease), stage III ([facility]) (12/2/2010), Family history of multiple myeloma (6/11/2014), Hypertension (12/2/2010), Lupus (systemic lupus erythematosus) ([facility]) (12/2/2010), Melanoma of skin ([facility]), Mitral valve prolapse, and Personal history of malignant melanoma of skin (2003).    SOCIAL HISTORY  Social History     Tobacco Use    Smoking status: Never Smoker    Smokeless tobacco: Never Used    Tobacco comment: denies   Vaping Use    Vaping Use: Never used   Substance and Sexual Activity    Alcohol use: Not Currently     Alcohol/week: 1.5 oz     Types: 3  "Glasses of wine per week     Comment: holidays    Drug use: Yes     Types: Marijuana, Oral     Comment: gummies for sleep    Sexual activity: Yes     Partners: Male     Comment:  x 39 yrs, 2 sons, retired      Social History     Substance and Sexual Activity   Drug Use Yes    Types: Marijuana, Oral    Comment: gummies for sleep       SURGICAL HISTORY   has a past surgical history that includes tonsillectomy; primary c section (1986,1987); other; other orthopedic surgery; and laminotomy.    CURRENT MEDICATIONS  Home Medications    **Home medications have not yet been reviewed for this encounter**         ALLERGIES  Allergies   Allergen Reactions    Sulfa Drugs      2002-02-20;arthritic symptoms  \"due to my lupus\"  2002-02-20;arthritic symptoms       PHYSICAL EXAM  VITAL SIGNS: BP (!) 171/90   Pulse 87   Temp 36.6 °C (97.8 °F) (Temporal)   Resp 18   Ht 1.676 m (5' 6\")   Wt 77.8 kg (171 lb 8.3 oz)   LMP 02/15/2002   SpO2 98%   BMI 27.68 kg/m²     Pulse ox interpretation: I interpret this pulse ox as normal.  Constitutional: Alert in no apparent distress.  HENT: No signs of trauma, Bilateral external ears normal, Nose normal.   Eyes: Conjunctiva normal, Non-icteric.   Neck: Normal range of motion, Supple, No stridor.   Lymphatic: No lymphadenopathy noted.   Cardiovascular: Regular rate and rhythm, no murmurs.   Thorax & Lungs: Normal breath sounds, No respiratory distress, No wheezing, No chest tenderness.   Abdomen: Bowel sounds normal, Soft, No tenderness, No masses, No pulsatile masses. No peritoneal signs.  Skin: Warm, Dry, No erythema, No rash.   Back: No midline bony tenderness.   Extremities: Intact distal pulses, No edema, No cyanosis.  Musculoskeletal: Good range of motion in all major joints. No or major deformities noted.   Neurologic: Alert , Normal motor function, Normal sensory function, No focal deficits noted.   Psychiatric: Affect normal, Judgment normal, Mood normal.     DIAGNOSTIC " STUDIES / PROCEDURES    EKG  Interpreted by me     Report   Date Value Ref Range Status   2022       [facility]    Test Date:  2022  Pt Name:    AL MCCAIN                Department: [facility]  MRN:        [id number]                      Room:  Gender:     Female                       Technician: [id number]  :        [id number]                   Requested By:ER TRIAGE PROTOCOL  Order #:    [id number]                    Reading MD: SHAWNA SCHNEIDER MD    Measurements  Intervals                                Axis  Rate:       67                           P:          67  NY:         145                          QRS:        2  QRSD:       94                           T:          55  QT:         403  QTc:        426    Interpretive Statements  Sinus rhythm  Atrial premature complex  Consider left atrial enlargement  Compared to ECG 2011 09:10:42  Atrial premature complex(es) now present  Sinus bradycardia no longer present  Electronically Signed On 2022 20:17:51 PDT by SHAWNA SCHNEIDER MD                 LABS  Labs Reviewed   CBC WITH DIFFERENTIAL - Abnormal; Notable for the following components:       Result Value    WBC 4.7 (*)     RBC 4.16 (*)     MCHC 33.5 (*)     All other components within normal limits   COMP METABOLIC PANEL - Abnormal; Notable for the following components:    Alkaline Phosphatase 103 (*)     All other components within normal limits   TROPONIN   ESTIMATED GFR     All labs reviewed by me.    RADIOLOGY  DX-CHEST-PORTABLE (1 VIEW)   Final Result      Stable chest without acute/new abnormality.        The radiologist's interpretation of all radiological studies have been reviewed by me.    COURSE & MEDICAL DECISION MAKING  Nursing notes, VS, PMSFHx reviewed in chart.     8:19 PM Patient seen and examined at bedside.  She has a number of seemingly unrelated complaints, though we discussed that arrhythmia could potentially explain chest  discomfort waking her from sleep, and a sense of dizziness.  Infection must also be considered.  She has been evaluated by cardiology in the past, and reports a normal echo.  It sounds like this may have been for similar symptoms.. Ordered for EKG, DX chest, CBC w/diff, CMP, and troponin to evaluate. Discussed plan of care with patient. I informed them that labs and imaging will be ordered to evaluate symptoms. Patient is understanding and agreeable with plan.    9:42 PM - I discussed the patient's diagnostic study results. I discussed plan for discharge and follow up as outlined below.  Her evaluation here is reassuring, but with our discussion about possible arrhythmia as a trigger for her symptoms, it is reasonable to refer her back to cardiology.  She says that she is actually spoken with, with respect to these recent symptoms, which may expedite her follow-up.  In any case, I placed a referral for consideration of Holter monitor or similar.  The patient is stable for discharge at this time and will return for any new or worsening symptoms. Patient verbalizes understanding and support with my plan for discharge.        DISPOSITION:  Patient will be discharged home in stable condition.    FOLLOW UP:  [facility]  [address] [place] [address], [address]  [facility] [address] [phone]  Call in 1 day      OUTPATIENT MEDICATIONS:  Discharge Medication List as of 5/31/2022  9:30 PM          FINAL IMPRESSION  1. Chest pain, unspecified type         I, Lissette Cavazos (Abby), karthikeyan scribing for, and in the presence of, Damon Briones M.D..    Electronically signed by: Lissette Cavazos (Abby), 5/31/2022    IDamon M.D. personally performed the services described in this documentation, as scribed by Lissette Cavazos in my presence, and it is both accurate and complete. C.     The note accurately reflects work and decisions made by me.  Damon Briones M.D.  5/31/2022  10:40 PM

## 2022-06-01 NOTE — ED TRIAGE NOTES
Pt bib family with c/o right sided chest pain that began at approximately 230 this AM. Pt states this happened last week and then 2 weeks prior she had a similar event in her left arm .

## 2022-07-14 ENCOUNTER — TELEPHONE (OUTPATIENT)
Dept: CARDIOLOGY | Facility: MEDICAL CENTER | Age: 67
End: 2022-07-14
Payer: MEDICARE

## 2022-07-14 NOTE — TELEPHONE ENCOUNTER
Spoke with pt who confirmed last time seeing cardio was in 2015 when they saw Dr. Carrillo, notes are in chart. Pt confirmed all recent testing is current from recent ED visit.    NP appt with MARTHA confirmed with pt. Instructions given for pt to bring a up to date medication list and list of any OTC if taking.

## 2022-07-26 ENCOUNTER — OFFICE VISIT (OUTPATIENT)
Dept: CARDIOLOGY | Facility: MEDICAL CENTER | Age: 67
End: 2022-07-26
Attending: EMERGENCY MEDICINE
Payer: MEDICARE

## 2022-07-26 VITALS
OXYGEN SATURATION: 96 % | SYSTOLIC BLOOD PRESSURE: 130 MMHG | RESPIRATION RATE: 14 BRPM | WEIGHT: 161 LBS | HEIGHT: 66 IN | DIASTOLIC BLOOD PRESSURE: 74 MMHG | BODY MASS INDEX: 25.88 KG/M2 | HEART RATE: 85 BPM

## 2022-07-26 DIAGNOSIS — R07.89 OTHER CHEST PAIN: ICD-10-CM

## 2022-07-26 DIAGNOSIS — R94.31 NONSPECIFIC ABNORMAL ELECTROCARDIOGRAM (ECG) (EKG): ICD-10-CM

## 2022-07-26 PROCEDURE — 99203 OFFICE O/P NEW LOW 30 MIN: CPT | Performed by: INTERNAL MEDICINE

## 2022-07-26 ASSESSMENT — ENCOUNTER SYMPTOMS
FEVER: 0
MYALGIAS: 0
DOUBLE VISION: 0
BRUISES/BLEEDS EASILY: 1
HEADACHES: 0
MUSCULOSKELETAL NEGATIVE: 1
CONSTITUTIONAL NEGATIVE: 1
SHORTNESS OF BREATH: 0
DEPRESSION: 0
COUGH: 0
ABDOMINAL PAIN: 0
GASTROINTESTINAL NEGATIVE: 1
NAUSEA: 0
CHILLS: 0
NERVOUS/ANXIOUS: 0
FOCAL WEAKNESS: 0
RESPIRATORY NEGATIVE: 1
EYES NEGATIVE: 1
DIZZINESS: 1
PALPITATIONS: 0
WEIGHT LOSS: 0
VOMITING: 0
PSYCHIATRIC NEGATIVE: 1
CLAUDICATION: 0
BLURRED VISION: 0
WEAKNESS: 0

## 2022-07-26 ASSESSMENT — FIBROSIS 4 INDEX: FIB4 SCORE: 1.11

## 2022-07-26 NOTE — PROGRESS NOTES
Chief Complaint   Patient presents with   • Chest Pain       Subjective     Maria Luisa Ochoa is a 67 y.o. female who presents today as a consult from Damon Snider for chest pain.    Thank you for allowing me to evaluate Mrs. Ochoa, who as you know is a 67 year old female with possible mitral valve prolapse, strong family history of coronary artery disease, lifelong nonsmoker. She was well until 6 months ago when she began to experience chest pain. She has had 3 episodes since. She describes her chest pain to be severe chest ache sensation of her right chest with radiation into her right shoulder and mid to left chest, lasting a minute. She denies associated shortness of breath, palpitations, diaphoresis, nausea and vomiting. She denies aggravating or alleviating factors. She was evaluated at [facility] Emergency Room on 05/31/22 and was recommended for follow up.     Past Medical History:   Diagnosis Date   • Acute bacterial conjunctivitis of right eye 09/13/2021   • ASTHMA    • ASTHMA 12/15/2011   • Cancer (HCC)     skin melanoma RLE   • Chemotherapy    • CKD (chronic kidney disease), stage III (HCC) 12/02/2010   • Family history of multiple myeloma 06/11/2014   • Lupus (systemic lupus erythematosus) (HCC) 12/02/2010   • Melanoma of skin (HCC)    • Mitral valve prolapse    • Personal history of malignant melanoma of skin 2003    right leg     Past Surgical History:   Procedure Laterality Date   • LAMINOTOMY     • OTHER      RLE skin melanoma excised   • OTHER ORTHOPEDIC SURGERY      spinal    • PRIMARY C SECTION  1986,1987   • TONSILLECTOMY       Family History   Problem Relation Age of Onset   • Cancer Mother         multiple myloma   • Heart Disease Father 40   • Hyperlipidemia Sister    • Hypertension Sister    • Cancer Sister         st IV colon CA   • Arthritis Maternal Grandmother 85        gout     Social History     Socioeconomic History   • Marital status:      Spouse name: Not  "on file   • Number of children: Not on file   • Years of education: Not on file   • Highest education level: Not on file   Occupational History   • Not on file   Tobacco Use   • Smoking status: Never Smoker   • Smokeless tobacco: Never Used   • Tobacco comment: denies   Vaping Use   • Vaping Use: Never used   Substance and Sexual Activity   • Alcohol use: Not Currently     Alcohol/week: 1.5 oz     Types: 3 Glasses of wine per week     Comment: holidays   • Drug use: Yes     Types: Marijuana, Oral     Comment: gummies for sleep   • Sexual activity: Yes     Partners: Male     Comment:  x 39 yrs, 2 sons, retired    Other Topics Concern   • Not on file   Social History Narrative   • Not on file     Social Determinants of Health     Financial Resource Strain: Not on file   Food Insecurity: Not on file   Transportation Needs: Not on file   Physical Activity: Not on file   Stress: Not on file   Social Connections: Not on file   Intimate Partner Violence: Not on file   Housing Stability: Not on file     Allergies   Allergen Reactions   • Sulfa Drugs      2002-02-20;arthritic symptoms  \"due to my lupus\"  2002-02-20;arthritic symptoms     (Medications reviewed.)  Outpatient Encounter Medications as of 7/26/2022   Medication Sig Dispense Refill   • albuterol (PROAIR HFA) 108 (90 Base) MCG/ACT Aero Soln inhalation aerosol Inhale 2 Puffs every 6 hours as needed for Shortness of Breath. 18 g 3   • budesonide-formoterol (SYMBICORT) 160-4.5 MCG/ACT Aerosol Inhale 2 Puffs 2 times a day. 10.2 g 3   • Cholecalciferol (VITAMIN D) 50 MCG (2000 UT) Cap Take 2,000 Units by mouth every day. (Patient not taking: Reported on 7/26/2022)     • Ascorbic Acid (VITAMIN C) 1000 MG Tab Take  by mouth. (Patient not taking: No sig reported)       No facility-administered encounter medications on file as of 7/26/2022.     Review of Systems   Constitutional: Negative.  Negative for chills, fever, malaise/fatigue and weight loss.   HENT: " "Negative.  Negative for hearing loss.    Eyes: Negative.  Negative for blurred vision and double vision.   Respiratory: Negative.  Negative for cough and shortness of breath.    Cardiovascular: Positive for chest pain. Negative for palpitations, claudication and leg swelling.   Gastrointestinal: Negative.  Negative for abdominal pain, nausea and vomiting.   Genitourinary: Negative.  Negative for dysuria and urgency.   Musculoskeletal: Negative.  Negative for joint pain and myalgias.   Skin: Negative.  Negative for itching and rash.   Neurological: Positive for dizziness. Negative for focal weakness, weakness and headaches.   Endo/Heme/Allergies: Positive for environmental allergies. Bruises/bleeds easily.   Psychiatric/Behavioral: Negative.  Negative for depression. The patient is not nervous/anxious.               Objective     /74 (BP Location: Left arm, Patient Position: Sitting, BP Cuff Size: Adult)   Pulse 85   Resp 14   Ht 1.676 m (5' 6\")   Wt 73 kg (161 lb)   LMP 02/15/2002   SpO2 96%   BMI 25.99 kg/m²     Physical Exam  Constitutional:       Appearance: She is well-developed.   HENT:      Head: Normocephalic and atraumatic.   Neck:      Vascular: No JVD.   Cardiovascular:      Rate and Rhythm: Normal rate and regular rhythm.      Heart sounds: Normal heart sounds.   Pulmonary:      Effort: Pulmonary effort is normal.      Breath sounds: Normal breath sounds.   Abdominal:      General: Bowel sounds are normal.      Palpations: Abdomen is soft.      Comments: No hepatosplenomegaly.   Musculoskeletal:         General: Normal range of motion.   Lymphadenopathy:      Cervical: No cervical adenopathy.   Skin:     General: Skin is warm and dry.   Neurological:      Mental Status: She is alert and oriented to person, place, and time.            CARDIAC STUDIES/PROCEDURES:    ECHOCARDIOGRAM CONCLUSIONS (03/04/21)  Left ventricular ejection fraction is visually estimated to be 65%.  The left atrium is " normal in size.  Mild aortic insufficiency.  Estimated right ventricular systolic pressure  is 24 mmHg.  The interatrial septum appears mobile, possibly aneurysmal.  Negative bubble study including Valsalva.  (study result reviewed)    EKG performed on (05/31/22) was reviewed: EKG personally interpreted shows sinus rhythm, premature atrial contractions with diffuse ST abnormalities.    Laboratory results of (04/11/22) were reviewed. Cholesterol profile of 173/112/56/95 mg/dL noted.    Assessment & Plan     1. Other chest pain     2. Nonspecific abnormal electrocardiogram (ECG) (EKG)         Medical Decision Making: Today's Assessment/Status/Plan:         1. Chest pain with abnormal EKG: She is experiencing chest pain as described above. We will perform a myocardial perfusion imaging study and follow up with her.     We will follow up in 3 months.    Thank you for this consult.

## 2022-09-06 ENCOUNTER — HOSPITAL ENCOUNTER (OUTPATIENT)
Dept: RADIOLOGY | Facility: MEDICAL CENTER | Age: 67
End: 2022-09-06
Attending: PHYSICIAN ASSISTANT
Payer: MEDICARE

## 2022-09-06 ENCOUNTER — OFFICE VISIT (OUTPATIENT)
Dept: URGENT CARE | Facility: PHYSICIAN GROUP | Age: 67
End: 2022-09-06
Payer: MEDICARE

## 2022-09-06 VITALS
TEMPERATURE: 98 F | DIASTOLIC BLOOD PRESSURE: 68 MMHG | HEART RATE: 73 BPM | HEIGHT: 67 IN | SYSTOLIC BLOOD PRESSURE: 118 MMHG | WEIGHT: 166.5 LBS | BODY MASS INDEX: 26.13 KG/M2 | RESPIRATION RATE: 12 BRPM | OXYGEN SATURATION: 96 %

## 2022-09-06 DIAGNOSIS — Z82.69 FAMILY HISTORY OF SYSTEMIC LUPUS ERYTHEMATOSUS: ICD-10-CM

## 2022-09-06 DIAGNOSIS — M25.571 ACUTE RIGHT ANKLE PAIN: ICD-10-CM

## 2022-09-06 DIAGNOSIS — M25.471 RIGHT ANKLE SWELLING: ICD-10-CM

## 2022-09-06 PROCEDURE — 73610 X-RAY EXAM OF ANKLE: CPT | Mod: RT

## 2022-09-06 PROCEDURE — 99214 OFFICE O/P EST MOD 30 MIN: CPT | Performed by: PHYSICIAN ASSISTANT

## 2022-09-06 RX ORDER — METHYLPREDNISOLONE 4 MG/1
TABLET ORAL
Qty: 21 TABLET | Refills: 0 | Status: SHIPPED | OUTPATIENT
Start: 2022-09-06 | End: 2022-10-04

## 2022-09-06 ASSESSMENT — FIBROSIS 4 INDEX: FIB4 SCORE: 1.11

## 2022-09-06 ASSESSMENT — ENCOUNTER SYMPTOMS
MYALGIAS: 1
FALLS: 0

## 2022-09-06 NOTE — PROGRESS NOTES
Subjective:   Lona Ochoa is a 67 y.o. female who presents for Foot Problem (Right foot pain x 2 days )        Patient presents for evaluation of right lateral ankle and foot pain that began yesterday and has been gradually worsening.  Patient does not recall any specific trauma but states that she has been more active than usual as her granddaughter was in town visiting.  She endorses swelling, pain with ambulation and difficulty ambulating.  She also endorses some numbness and tingling in her first and second toes.  She is otherwise feeling well and denies nausea, vomiting, fevers, chills.  No history of gout.  Past medical history significant for SLE but she has not had a flare since 2003.    Review of Systems   Musculoskeletal:  Positive for joint pain and myalgias. Negative for falls.     PMH:  has a past medical history of Acute bacterial conjunctivitis of right eye (09/13/2021), ASTHMA, ASTHMA (12/15/2011), Cancer ([facility]), Chemotherapy, CKD (chronic kidney disease), stage III ([facility]) (12/02/2010), Family history of multiple myeloma (06/11/2014), Lupus (systemic lupus erythematosus) ([facility]) (12/02/2010), Melanoma of skin ([facility]), Mitral valve prolapse, and Personal history of malignant melanoma of skin (2003).    She has no past medical history of Angina, Backpain, Bronchitis, CAD (coronary artery disease), Dialysis, Fall, Heart murmur, Indigestion, Jaundice, Liver disease, Other specified symptom associated with female genital organs, Pacemaker, Personal history of venous thrombosis and embolism, Pneumonia, Psychiatric disorder, Psychiatric problem, Rheumatic fever, or Unspecified urinary incontinence.  MEDS:   Current Outpatient Medications:     methylPREDNISolone (MEDROL DOSEPAK) 4 MG Tablet Therapy Pack, Follow schedule on package instructions., Disp: 21 Tablet, Rfl: 0    Cholecalciferol (VITAMIN D) 50 MCG (2000 UT) Cap, Take 2,000 Units by mouth every day., Disp: , Rfl:     albuterol (PROAIR HFA) 108 (90 Base)  "MCG/ACT Aero Soln inhalation aerosol, Inhale 2 Puffs every 6 hours as needed for Shortness of Breath., Disp: 18 g, Rfl: 3    budesonide-formoterol (SYMBICORT) 160-4.5 MCG/ACT Aerosol, Inhale 2 Puffs 2 times a day., Disp: 10.2 g, Rfl: 3    Ascorbic Acid (VITAMIN C) 1000 MG Tab, Take  by mouth., Disp: , Rfl:   ALLERGIES:   Allergies   Allergen Reactions    Sulfa Drugs      2002-02-20;arthritic symptoms  \"due to my lupus\"  2002-02-20;arthritic symptoms     SURGHX:   Past Surgical History:   Procedure Laterality Date    LAMINOTOMY      OTHER      RLE skin melanoma excised    OTHER ORTHOPEDIC SURGERY      spinal     PRIMARY C SECTION  1986,1987    TONSILLECTOMY       SOCHX:  reports that she has never smoked. She has never used smokeless tobacco. She reports that she does not currently use alcohol after a past usage of about 1.5 oz per week. She reports current drug use. Drugs: Marijuana and Oral.  FH: Family history was reviewed, no pertinent findings to report   Objective:   /68   Pulse 73   Temp 36.7 °C (98 °F) (Temporal)   Resp 12   Ht 1.689 m (5' 6.5\")   Wt 75.5 kg (166 lb 8 oz)   LMP 02/15/2002   SpO2 96%   BMI 26.47 kg/m²   Physical Exam  Vitals reviewed.   Constitutional:       General: She is not in acute distress.     Appearance: Normal appearance. She is well-developed. She is not toxic-appearing.   HENT:      Head: Normocephalic and atraumatic.      Right Ear: External ear normal.      Left Ear: External ear normal.      Nose: Nose normal.   Cardiovascular:      Rate and Rhythm: Normal rate and regular rhythm.   Pulmonary:      Effort: Pulmonary effort is normal. No respiratory distress.      Breath sounds: No stridor.   Musculoskeletal:      Comments: Right ankle/foot:  Appearance: Mild lateral edema. No bruising, erythema, or deformity appreciated  Palpation: Mildly tender to palpation along CFL and ATFL.  Nontender to palpation along lateral malleolus, Achilles tendon and insertion, calf.  No " TTP along medial malleolus or deltoid ligament.  No TTP PTFL.  No TTP along midfoot, base of the 5th metatarsal, MTP joints, or toes.   ROM: Restricted  Neurovascular: 2+ dorsalis pedis and posterior tibial.  Sensation intact and equal bilaterally   Skin:     General: Skin is dry.   Neurological:      Comments: Alert and oriented.    Psychiatric:         Speech: Speech normal.         Behavior: Behavior normal.     Imaging:    FINDINGS:     There is no focal soft tissue swelling.     There is no displaced fracture or dislocation.     The alignment of the ankle is within normal limits.     There is slight cortical irregularity of the distal fibula which may be due to old or subacute injury, but there is no evidence of an acute fracture.     IMPRESSION:     1.  No acute displaced right ankle fracture.           Exam Ended: 09/06/22 11:03 AM           Assessment/Plan:   1. Acute right ankle pain  - DX-ANKLE 3+ VIEWS RIGHT; Future  - methylPREDNISolone (MEDROL DOSEPAK) 4 MG Tablet Therapy Pack; Follow schedule on package instructions.  Dispense: 21 Tablet; Refill: 0    2. Right ankle swelling  - methylPREDNISolone (MEDROL DOSEPAK) 4 MG Tablet Therapy Pack; Follow schedule on package instructions.  Dispense: 21 Tablet; Refill: 0    3. Family history of systemic lupus erythematosus    Considerations include but not limited to ankle sprain, fracture, idiopathic arthritis, SLE flare, inflammatory arthritis.  This does not look like a septic joint.  This does not look like a soft tissue infection.    Imaging reviewed with patient.  No evidence of acute fracture, but there is a slight cortical irregularity in the distal fibula.  Patient believes this may be due to a remote ankle injury/fracture many years ago.    Exact cause is unclear.  Differential reviewed with patient.  Patient fitted with a cam boot and crutches.  She may bear weight as tolerated.  I would like her to elevate and ice.  We will also start her on a Medrol  dose pack.  She should discontinue NSAIDs while taking.  I would like her to follow-up with her PCP in 5 to 7 days for reevaluation.  Recommend immediate reevaluation if she develops any new or worsening symptoms.    Differential diagnosis, natural history, supportive care, and indications for immediate follow-up discussed.

## 2022-09-28 ENCOUNTER — HOSPITAL ENCOUNTER (OUTPATIENT)
Dept: RADIOLOGY | Facility: MEDICAL CENTER | Age: 67
End: 2022-09-28
Attending: INTERNAL MEDICINE
Payer: MEDICARE

## 2022-09-28 DIAGNOSIS — R07.89 OTHER CHEST PAIN: ICD-10-CM

## 2022-09-28 DIAGNOSIS — R94.31 NONSPECIFIC ABNORMAL ELECTROCARDIOGRAM (ECG) (EKG): ICD-10-CM

## 2022-09-28 PROCEDURE — 93018 CV STRESS TEST I&R ONLY: CPT | Performed by: INTERNAL MEDICINE

## 2022-09-28 PROCEDURE — 78452 HT MUSCLE IMAGE SPECT MULT: CPT | Mod: 26 | Performed by: INTERNAL MEDICINE

## 2022-09-28 PROCEDURE — A9502 TC99M TETROFOSMIN: HCPCS

## 2022-10-04 ENCOUNTER — OFFICE VISIT (OUTPATIENT)
Dept: CARDIOLOGY | Facility: MEDICAL CENTER | Age: 67
End: 2022-10-04
Payer: MEDICARE

## 2022-10-04 VITALS
HEART RATE: 62 BPM | WEIGHT: 166 LBS | RESPIRATION RATE: 16 BRPM | OXYGEN SATURATION: 96 % | HEIGHT: 66 IN | BODY MASS INDEX: 26.68 KG/M2 | DIASTOLIC BLOOD PRESSURE: 62 MMHG | SYSTOLIC BLOOD PRESSURE: 120 MMHG

## 2022-10-04 DIAGNOSIS — R07.89 OTHER CHEST PAIN: ICD-10-CM

## 2022-10-04 PROCEDURE — 99213 OFFICE O/P EST LOW 20 MIN: CPT | Performed by: INTERNAL MEDICINE

## 2022-10-04 ASSESSMENT — ENCOUNTER SYMPTOMS
WEIGHT LOSS: 0
FEVER: 0
BRUISES/BLEEDS EASILY: 0
ABDOMINAL PAIN: 0
VOMITING: 0
BLURRED VISION: 0
NEUROLOGICAL NEGATIVE: 1
DOUBLE VISION: 0
CHILLS: 0
CARDIOVASCULAR NEGATIVE: 1
PSYCHIATRIC NEGATIVE: 1
MYALGIAS: 0
HEADACHES: 0
DIZZINESS: 0
DEPRESSION: 0
SHORTNESS OF BREATH: 0
FOCAL WEAKNESS: 0
NERVOUS/ANXIOUS: 0
WEAKNESS: 0
RESPIRATORY NEGATIVE: 1
MUSCULOSKELETAL NEGATIVE: 1
EYES NEGATIVE: 1
GASTROINTESTINAL NEGATIVE: 1
CONSTITUTIONAL NEGATIVE: 1
COUGH: 0
NAUSEA: 0
CLAUDICATION: 0
PALPITATIONS: 0

## 2022-10-04 ASSESSMENT — FIBROSIS 4 INDEX: FIB4 SCORE: 1.11

## 2022-10-04 NOTE — PROGRESS NOTES
Chief Complaint   Patient presents with    Chest Pain     F/V Dx: Other chest pain    Dizziness       Subjective     Maria Luisa Ochoa is a 67 y.o. female who presents today for follow up of chest pain.    Since the patient's last visit on 07/26/22, she has been doing well clinically. She denies chest pain, shortness of breath, palpitations, nausea/vomiting or diaphoresis. She has been active riding the treadmill. She is taking a Cruise to [employer] soon.     Past Medical History:   Diagnosis Date    Acute bacterial conjunctivitis of right eye 09/13/2021    ASTHMA     ASTHMA 12/15/2011    Cancer (HCC)     skin melanoma RLE    Chemotherapy     CKD (chronic kidney disease), stage III (HCC) 12/02/2010    Family history of multiple myeloma 06/11/2014    Lupus (systemic lupus erythematosus) (HCC) 12/02/2010    Melanoma of skin (HCC)     Mitral valve prolapse     Personal history of malignant melanoma of skin 2003    right leg     Past Surgical History:   Procedure Laterality Date    LAMINOTOMY      OTHER      RLE skin melanoma excised    OTHER ORTHOPEDIC SURGERY      spinal     PRIMARY C SECTION  1986,1987    TONSILLECTOMY       Family History   Problem Relation Age of Onset    Cancer Mother         multiple myloma    Heart Disease Father 40    Hyperlipidemia Sister     Hypertension Sister     Cancer Sister         st IV colon CA    Arthritis Maternal Grandmother 85        gout     Social History     Socioeconomic History    Marital status:      Spouse name: Not on file    Number of children: Not on file    Years of education: Not on file    Highest education level: Not on file   Occupational History    Not on file   Tobacco Use    Smoking status: Never    Smokeless tobacco: Never    Tobacco comments:     denies   Vaping Use    Vaping Use: Never used   Substance and Sexual Activity    Alcohol use: Not Currently     Alcohol/week: 1.5 oz     Types: 3 Glasses of wine per week     Comment: holidays    Drug use: Yes      "Types: Marijuana, Oral     Comment: gummies for sleep    Sexual activity: Yes     Partners: Male     Comment:  x 39 yrs, 2 sons, retired    Other Topics Concern    Not on file   Social History Narrative    Not on file     Social Determinants of Health     Financial Resource Strain: Not on file   Food Insecurity: Not on file   Transportation Needs: Not on file   Physical Activity: Not on file   Stress: Not on file   Social Connections: Not on file   Intimate Partner Violence: Not on file   Housing Stability: Not on file     Allergies   Allergen Reactions    Sulfa Drugs      2002-02-20;arthritic symptoms  \"due to my lupus\"  2002-02-20;arthritic symptoms     (Medications reviewed.)  Outpatient Encounter Medications as of 10/4/2022   Medication Sig Dispense Refill    albuterol (PROAIR HFA) 108 (90 Base) MCG/ACT Aero Soln inhalation aerosol Inhale 2 Puffs every 6 hours as needed for Shortness of Breath. 18 g 3    budesonide-formoterol (SYMBICORT) 160-4.5 MCG/ACT Aerosol Inhale 2 Puffs 2 times a day. 10.2 g 3    Ascorbic Acid (VITAMIN C) 1000 MG Tab Take  by mouth.      methylPREDNISolone (MEDROL DOSEPAK) 4 MG Tablet Therapy Pack Follow schedule on package instructions. (Patient not taking: Reported on 10/4/2022) 21 Tablet 0    [DISCONTINUED] Cholecalciferol (VITAMIN D) 50 MCG (2000 UT) Cap Take 2,000 Units by mouth every day. (Patient not taking: Reported on 10/4/2022)       No facility-administered encounter medications on file as of 10/4/2022.     Review of Systems   Constitutional: Negative.  Negative for chills, fever, malaise/fatigue and weight loss.   HENT: Negative.  Negative for hearing loss.    Eyes: Negative.  Negative for blurred vision and double vision.   Respiratory: Negative.  Negative for cough and shortness of breath.    Cardiovascular: Negative.  Negative for chest pain, palpitations, claudication and leg swelling.   Gastrointestinal: Negative.  Negative for abdominal pain, nausea and vomiting. " "  Genitourinary: Negative.  Negative for dysuria and urgency.   Musculoskeletal: Negative.  Negative for joint pain and myalgias.   Skin: Negative.  Negative for itching and rash.   Neurological: Negative.  Negative for dizziness, focal weakness, weakness and headaches.   Endo/Heme/Allergies: Negative.  Does not bruise/bleed easily.   Psychiatric/Behavioral: Negative.  Negative for depression. The patient is not nervous/anxious.             Objective     /62 (BP Location: Left arm, Patient Position: Sitting, BP Cuff Size: Adult)   Pulse 62   Resp 16   Ht 1.676 m (5' 6\")   Wt 75.3 kg (166 lb)   LMP 02/15/2002   SpO2 96%   BMI 26.79 kg/m²     Physical Exam  Constitutional:       Appearance: Normal appearance. She is well-developed and normal weight.   HENT:      Head: Normocephalic and atraumatic.   Neck:      Vascular: No JVD.   Cardiovascular:      Rate and Rhythm: Normal rate and regular rhythm.      Heart sounds: Normal heart sounds.   Pulmonary:      Effort: Pulmonary effort is normal.      Breath sounds: Normal breath sounds.   Abdominal:      General: Bowel sounds are normal.      Palpations: Abdomen is soft.      Comments: No hepatosplenomegaly.   Musculoskeletal:         General: Normal range of motion.   Lymphadenopathy:      Cervical: No cervical adenopathy.   Skin:     General: Skin is warm and dry.   Neurological:      Mental Status: She is alert and oriented to person, place, and time.          CARDIAC STUDIES/PROCEDURES:     ECHOCARDIOGRAM CONCLUSIONS (03/04/21)  Left ventricular ejection fraction is visually estimated to be 65%.  The left atrium is normal in size.  Mild aortic insufficiency.  Estimated right ventricular systolic pressure  is 24 mmHg.  The interatrial septum appears mobile, possibly aneurysmal.  Negative bubble study including Valsalva.     EKG performed on (05/31/22) EKG shows sinus rhythm, premature atrial contractions with diffuse ST abnormalities.     Laboratory results " of (04/11/22) Cholesterol profile of 173/112/56/95 mg/dL noted.    MYOCARDIAL PERFUSION STUDY CONCLUSIONS (09/28/22)  NUCLEAR IMAGING INTERPRETATION  Normal exercise myocardial perfusion study.  No evidence of ischemia or infarct.  SDS 0.  LVEF 69%.  Fair exercise tolerance. Eliud 05:41 7.1 METS.  Negative stress ECG for ischemia.  Occasional PVC's.   (study result reviewed)    Assessment & Plan     1. Other chest pain            Medical Decision Making: Today's Assessment/Status/Plan:        Chest pain: Her chest pain has resolved and her cardiac studies were unremarkable. No further studies recommended at this time.    We will see the patient as needed.    CC Zenaida Albarran

## 2022-11-07 ENCOUNTER — PATIENT MESSAGE (OUTPATIENT)
Dept: HEALTH INFORMATION MANAGEMENT | Facility: OTHER | Age: 67
End: 2022-11-07

## 2023-08-08 ENCOUNTER — APPOINTMENT (RX ONLY)
Dept: URBAN - METROPOLITAN AREA CLINIC 22 | Facility: CLINIC | Age: 68
Setting detail: DERMATOLOGY
End: 2023-08-08

## 2023-08-08 DIAGNOSIS — L57.0 ACTINIC KERATOSIS: ICD-10-CM

## 2023-08-08 DIAGNOSIS — Z85.820 PERSONAL HISTORY OF MALIGNANT MELANOMA OF SKIN: ICD-10-CM

## 2023-08-08 DIAGNOSIS — L82.1 OTHER SEBORRHEIC KERATOSIS: ICD-10-CM

## 2023-08-08 DIAGNOSIS — D22 MELANOCYTIC NEVI: ICD-10-CM

## 2023-08-08 DIAGNOSIS — L81.4 OTHER MELANIN HYPERPIGMENTATION: ICD-10-CM

## 2023-08-08 PROBLEM — D22.71 MELANOCYTIC NEVI OF RIGHT LOWER LIMB, INCLUDING HIP: Status: ACTIVE | Noted: 2023-08-08

## 2023-08-08 PROBLEM — D22.5 MELANOCYTIC NEVI OF TRUNK: Status: ACTIVE | Noted: 2023-08-08

## 2023-08-08 PROBLEM — D23.71 OTHER BENIGN NEOPLASM OF SKIN OF RIGHT LOWER LIMB, INCLUDING HIP: Status: ACTIVE | Noted: 2023-08-08

## 2023-08-08 PROBLEM — D22.72 MELANOCYTIC NEVI OF LEFT LOWER LIMB, INCLUDING HIP: Status: ACTIVE | Noted: 2023-08-08

## 2023-08-08 PROCEDURE — 17000 DESTRUCT PREMALG LESION: CPT

## 2023-08-08 PROCEDURE — ? LIQUID NITROGEN

## 2023-08-08 PROCEDURE — 99203 OFFICE O/P NEW LOW 30 MIN: CPT | Mod: 25

## 2023-08-08 PROCEDURE — ? COUNSELING

## 2023-08-08 ASSESSMENT — LOCATION SIMPLE DESCRIPTION DERM
LOCATION SIMPLE: RIGHT CLAVICULAR SKIN
LOCATION SIMPLE: LEFT BREAST
LOCATION SIMPLE: LEFT FOREARM
LOCATION SIMPLE: RIGHT PRETIBIAL REGION
LOCATION SIMPLE: INFERIOR FOREHEAD
LOCATION SIMPLE: CHEST
LOCATION SIMPLE: ABDOMEN
LOCATION SIMPLE: NOSE
LOCATION SIMPLE: LEFT PRETIBIAL REGION
LOCATION SIMPLE: LEFT ANTERIOR NECK
LOCATION SIMPLE: UPPER BACK
LOCATION SIMPLE: RIGHT FOREARM
LOCATION SIMPLE: RIGHT THIGH

## 2023-08-08 ASSESSMENT — LOCATION DETAILED DESCRIPTION DERM
LOCATION DETAILED: INFERIOR MID FOREHEAD
LOCATION DETAILED: INFERIOR THORACIC SPINE
LOCATION DETAILED: LEFT LATERAL BREAST 5-6:00 REGION
LOCATION DETAILED: NASAL ROOT
LOCATION DETAILED: RIGHT MEDIAL SUPERIOR CHEST
LOCATION DETAILED: RIGHT LATERAL SUPERIOR CHEST
LOCATION DETAILED: RIGHT PROXIMAL PRETIBIAL REGION
LOCATION DETAILED: LEFT PROXIMAL PRETIBIAL REGION
LOCATION DETAILED: LEFT VENTRAL PROXIMAL FOREARM
LOCATION DETAILED: LEFT INFERIOR ANTERIOR NECK
LOCATION DETAILED: RIGHT CLAVICULAR SKIN
LOCATION DETAILED: RIGHT LATERAL ABDOMEN
LOCATION DETAILED: LEFT MEDIAL BREAST 7-8:00 REGION
LOCATION DETAILED: SUPERIOR THORACIC SPINE
LOCATION DETAILED: RIGHT VENTRAL PROXIMAL FOREARM
LOCATION DETAILED: RIGHT ANTERIOR DISTAL THIGH

## 2023-08-08 ASSESSMENT — LOCATION ZONE DERM
LOCATION ZONE: FACE
LOCATION ZONE: NECK
LOCATION ZONE: ARM
LOCATION ZONE: LEG
LOCATION ZONE: TRUNK
LOCATION ZONE: NOSE

## 2023-08-08 NOTE — PROCEDURE: LIQUID NITROGEN
Consent: The patient's consent was obtained including but not limited to risks of crusting, scabbing, blistering, scarring, darker or lighter pigmentary change, recurrence, incomplete removal and infection.
Post-Care Instructions: I reviewed with the patient in detail post-care instructions. Patient is to wear sunprotection, and avoid picking at any of the treated lesions. Pt may apply Vaseline to crusted or scabbing areas.
Duration Of Freeze Thaw-Cycle (Seconds): 0
Show Applicator Variable?: Yes
Render Note In Bullet Format When Appropriate: No
Number Of Freeze-Thaw Cycles: 2 freeze-thaw cycles
Detail Level: Detailed

## 2024-05-04 ENCOUNTER — OFFICE VISIT (OUTPATIENT)
Dept: URGENT CARE | Facility: PHYSICIAN GROUP | Age: 69
End: 2024-05-04
Payer: COMMERCIAL

## 2024-05-04 VITALS
TEMPERATURE: 97 F | SYSTOLIC BLOOD PRESSURE: 120 MMHG | DIASTOLIC BLOOD PRESSURE: 76 MMHG | HEIGHT: 65 IN | OXYGEN SATURATION: 96 % | BODY MASS INDEX: 27.36 KG/M2 | RESPIRATION RATE: 18 BRPM | WEIGHT: 164.2 LBS | HEART RATE: 84 BPM

## 2024-05-04 DIAGNOSIS — J06.9 VIRAL URI WITH COUGH: ICD-10-CM

## 2024-05-04 DIAGNOSIS — J34.89 NASAL CONGESTION WITH RHINORRHEA: ICD-10-CM

## 2024-05-04 DIAGNOSIS — R05.1 ACUTE COUGH: ICD-10-CM

## 2024-05-04 DIAGNOSIS — R09.81 NASAL CONGESTION WITH RHINORRHEA: ICD-10-CM

## 2024-05-04 PROCEDURE — 3078F DIAST BP <80 MM HG: CPT | Performed by: NURSE PRACTITIONER

## 2024-05-04 PROCEDURE — 99213 OFFICE O/P EST LOW 20 MIN: CPT | Performed by: NURSE PRACTITIONER

## 2024-05-04 PROCEDURE — 3074F SYST BP LT 130 MM HG: CPT | Performed by: NURSE PRACTITIONER

## 2024-05-04 RX ORDER — DEXTROMETHORPHAN HYDROBROMIDE AND PROMETHAZINE HYDROCHLORIDE 15; 6.25 MG/5ML; MG/5ML
5 SYRUP ORAL EVERY 6 HOURS PRN
Qty: 140 ML | Refills: 0 | Status: SHIPPED | OUTPATIENT
Start: 2024-05-04 | End: 2024-05-11

## 2024-05-04 ASSESSMENT — ENCOUNTER SYMPTOMS
FEVER: 0
EYE DISCHARGE: 0
VOMITING: 0
DIARRHEA: 0
COUGH: 1
MYALGIAS: 0
CHILLS: 0
CONSTIPATION: 0
SHORTNESS OF BREATH: 0
EYE REDNESS: 0
HEADACHES: 0
DIZZINESS: 0
ABDOMINAL PAIN: 0
SORE THROAT: 1
NECK PAIN: 0
WEAKNESS: 0
WHEEZING: 0
NAUSEA: 0

## 2024-05-04 ASSESSMENT — FIBROSIS 4 INDEX: FIB4 SCORE: 1.129598352762311278

## 2024-05-04 NOTE — PROGRESS NOTES
Subjective     Maria Luisa Ochoa is a 68 y.o. female who presents with Flu Like Symptoms (5 days)            HPI  Carmelo is coming to urgent care today as she has been experiencing cold-like symptoms x 5 days.  Very thin nasal congestion, runny nose, postnasal drainage with clear to yellow/green nasal discharge.  Postnasal drainage is causing dry scratchy throat and mild cough.  Does have a Thania pot but has not been using.  Taking over-the-counter Advil. Albuterol inhaler as needed.  Eating and drinking without nausea, vomiting or diarrhea.    PMH:  has a past medical history of Acute bacterial conjunctivitis of right eye (09/13/2021), ASTHMA, ASTHMA (12/15/2011), Cancer ([facility]), Chemotherapy, CKD (chronic kidney disease), stage III (12/02/2010), Family history of multiple myeloma (06/11/2014), Lupus (systemic lupus erythematosus) ([facility]) (12/02/2010), Melanoma of skin ([facility]), Mitral valve prolapse, and Personal history of malignant melanoma of skin (2003).    She has no past medical history of Backpain, Bronchitis, CAD (coronary artery disease), Dialysis, Fall, Heart murmur, Indigestion, Jaundice, Liver disease, Other specified symptom associated with female genital organs, Pacemaker, Personal history of venous thrombosis and embolism, Pneumonia, Psychiatric disorder, Psychiatric problem, Rheumatic fever, or Unspecified urinary incontinence.  MEDS:   Current Outpatient Medications:     promethazine-dextromethorphan (PROMETHAZINE-DM) 6.25-15 MG/5ML syrup, Take 5 mL by mouth every 6 hours as needed for Cough for up to 7 days. Causes drowsiness, do not drive or work while using. Caution with use with other sedating medications., Disp: 140 mL, Rfl: 0    albuterol (PROAIR HFA) 108 (90 Base) MCG/ACT Aero Soln inhalation aerosol, Inhale 2 Puffs every 6 hours as needed for Shortness of Breath., Disp: 18 g, Rfl: 3    budesonide-formoterol (SYMBICORT) 160-4.5 MCG/ACT Aerosol, Inhale 2 Puffs 2 times a day., Disp: 10.2 g, Rfl:  "3    Ascorbic Acid (VITAMIN C) 1000 MG Tab, Take  by mouth. (Patient not taking: Reported on 5/4/2024), Disp: , Rfl:   ALLERGIES:   Allergies   Allergen Reactions    Sulfa Drugs      2002-02-20;arthritic symptoms  \"due to my lupus\"  2002-02-20;arthritic symptoms     SURGHX:   Past Surgical History:   Procedure Laterality Date    LAMINOTOMY      OTHER      RLE skin melanoma excised    OTHER ORTHOPEDIC SURGERY      spinal     PRIMARY C SECTION  1986,1987    TONSILLECTOMY       SOCHX:  reports that she has never smoked. She has never used smokeless tobacco. She reports that she does not currently use alcohol after a past usage of about 1.5 oz of alcohol per week. She reports current drug use. Drugs: Marijuana and Oral.  FH: Family history was reviewed, no pertinent findings to report    Review of Systems   Constitutional:  Negative for chills, fever and malaise/fatigue.   HENT:  Positive for congestion and sore throat. Negative for ear pain.    Eyes:  Negative for discharge and redness.   Respiratory:  Positive for cough. Negative for shortness of breath and wheezing.    Gastrointestinal:  Negative for abdominal pain, constipation, diarrhea, nausea and vomiting.   Musculoskeletal:  Negative for myalgias and neck pain.   Skin:  Negative for itching and rash.   Neurological:  Negative for dizziness, weakness and headaches.   Endo/Heme/Allergies:  Negative for environmental allergies.   All other systems reviewed and are negative.             Objective     /76   Pulse 84   Temp 36.1 °C (97 °F) (Temporal)   Resp 18   Ht 1.66 m (5' 5.35\")   Wt 74.5 kg (164 lb 3.2 oz)   LMP 02/15/2002   SpO2 96%   BMI 27.03 kg/m²      Physical Exam  Vitals reviewed.   Constitutional:       General: She is awake. She is not in acute distress.     Appearance: Normal appearance. She is not ill-appearing, toxic-appearing or diaphoretic.   HENT:      Head: Normocephalic.      Right Ear: Ear canal and external ear normal. A middle " ear effusion is present.      Left Ear: Ear canal and external ear normal. A middle ear effusion is present.      Nose: Congestion and rhinorrhea present.      Mouth/Throat:      Lips: Pink.      Mouth: Mucous membranes are dry.      Pharynx: Oropharynx is clear. Uvula midline.   Cardiovascular:      Rate and Rhythm: Normal rate.   Pulmonary:      Effort: Pulmonary effort is normal.      Breath sounds: Normal breath sounds and air entry.   Skin:     General: Skin is warm and dry.   Neurological:      Mental Status: She is alert and oriented to person, place, and time.   Psychiatric:         Attention and Perception: Attention normal.         Mood and Affect: Mood normal.         Speech: Speech normal.         Behavior: Behavior normal. Behavior is cooperative.                             Assessment & Plan        1. Acute cough    - promethazine-dextromethorphan (PROMETHAZINE-DM) 6.25-15 MG/5ML syrup; Take 5 mL by mouth every 6 hours as needed for Cough for up to 7 days. Causes drowsiness, do not drive or work while using. Caution with use with other sedating medications.  Dispense: 140 mL; Refill: 0    3. Viral URI with cough    -Maintain hydration/water intake  -May use over the counter Ibuprofen/Tylenol as needed for fever  -May use humidifier/vaporizer at home as needed for dry cough/nasal passages  -Gargle salt water or throat lozenges as needed for throat irritation/dry cough  -Get rest  -May use daily longer acting antihistamine as needed (no decongestant) for any post nasal drainage   -May use saline nasal spray/Flonase as needed to flush any nasal congestion/post nasal drainage   -Monitor for fevers, worse cough, phlegm, shortness of breath, wheeze, chest tightness- need re-evaluation

## 2024-05-14 ENCOUNTER — OFFICE VISIT (OUTPATIENT)
Dept: URGENT CARE | Facility: PHYSICIAN GROUP | Age: 69
End: 2024-05-14
Payer: MEDICARE

## 2024-05-14 VITALS
SYSTOLIC BLOOD PRESSURE: 126 MMHG | HEART RATE: 78 BPM | BODY MASS INDEX: 26.36 KG/M2 | HEIGHT: 66 IN | RESPIRATION RATE: 22 BRPM | TEMPERATURE: 97.2 F | WEIGHT: 164 LBS | OXYGEN SATURATION: 97 % | DIASTOLIC BLOOD PRESSURE: 78 MMHG

## 2024-05-14 DIAGNOSIS — J98.8 RTI (RESPIRATORY TRACT INFECTION): ICD-10-CM

## 2024-05-14 DIAGNOSIS — J45.21 MILD INTERMITTENT ASTHMA WITH ACUTE EXACERBATION: ICD-10-CM

## 2024-05-14 PROCEDURE — 3078F DIAST BP <80 MM HG: CPT | Performed by: PHYSICIAN ASSISTANT

## 2024-05-14 PROCEDURE — 99214 OFFICE O/P EST MOD 30 MIN: CPT | Performed by: PHYSICIAN ASSISTANT

## 2024-05-14 PROCEDURE — 3074F SYST BP LT 130 MM HG: CPT | Performed by: PHYSICIAN ASSISTANT

## 2024-05-14 RX ORDER — METHYLPREDNISOLONE 4 MG/1
TABLET ORAL
Qty: 21 TABLET | Refills: 0 | Status: SHIPPED | OUTPATIENT
Start: 2024-05-14

## 2024-05-14 RX ORDER — BENZONATATE 100 MG/1
100 CAPSULE ORAL 3 TIMES DAILY PRN
Qty: 30 CAPSULE | Refills: 0 | Status: SHIPPED | OUTPATIENT
Start: 2024-05-14

## 2024-05-14 RX ORDER — DOXYCYCLINE HYCLATE 100 MG
100 TABLET ORAL 2 TIMES DAILY
Qty: 14 TABLET | Refills: 0 | Status: SHIPPED | OUTPATIENT
Start: 2024-05-14

## 2024-05-14 ASSESSMENT — ENCOUNTER SYMPTOMS
SORE THROAT: 0
PALPITATIONS: 0
COUGH: 1
SPUTUM PRODUCTION: 1
ABDOMINAL PAIN: 0
CHILLS: 0
CARDIOVASCULAR NEGATIVE: 1
VOMITING: 0
DIARRHEA: 0
DIZZINESS: 0
NAUSEA: 0
RHINORRHEA: 1
MYALGIAS: 0
SHORTNESS OF BREATH: 0
FEVER: 0
WHEEZING: 0
HEADACHES: 1

## 2024-05-14 ASSESSMENT — FIBROSIS 4 INDEX: FIB4 SCORE: 1.129598352762311278

## 2024-05-14 NOTE — PROGRESS NOTES
"Subjective     Maria Luisa Ochoa is a very pleasant 68 y.o. female who presents with Cough (First 2 weeks Runny nose, sneezing, green mucus, chills, head ache \"facial pain\"/On going cough x 3 weeks as of 5/13/2024)            Second visit to urgent care.  No improvement from previous visit despite completing all recommendations    Cough  This is a new problem. The current episode started 1 to 4 weeks ago. The problem has been gradually worsening. The problem occurs every few minutes. The cough is Productive of sputum. Associated symptoms include headaches, nasal congestion, postnasal drip and rhinorrhea. Pertinent negatives include no chest pain, chills, ear pain, fever, myalgias, sore throat, shortness of breath or wheezing. She has tried a beta-agonist inhaler, OTC cough suppressant, prescription cough suppressant and steroid inhaler for the symptoms. The treatment provided mild relief. Her past medical history is significant for asthma. There is no history of bronchitis or pneumonia.         PMH:  has a past medical history of Acute bacterial conjunctivitis of right eye (09/13/2021), ASTHMA, ASTHMA (12/15/2011), Cancer ([facility]), Chemotherapy, CKD (chronic kidney disease), stage III (12/02/2010), Family history of multiple myeloma (06/11/2014), Lupus (systemic lupus erythematosus) ([facility]) (12/02/2010), Melanoma of skin ([facility]), Mitral valve prolapse, and Personal history of malignant melanoma of skin (2003).    She has no past medical history of Backpain, Bronchitis, CAD (coronary artery disease), Dialysis, Fall, Heart murmur, Indigestion, Jaundice, Liver disease, Other specified symptom associated with female genital organs, Pacemaker, Personal history of venous thrombosis and embolism, Pneumonia, Psychiatric disorder, Psychiatric problem, Rheumatic fever, or Unspecified urinary incontinence.  MEDS:   Current Outpatient Medications:     Doxylamine-DM (NIGHT-TIME COUGH PO), Take  by mouth., Disp: , Rfl:     albuterol " "(PROAIR HFA) 108 (90 Base) MCG/ACT Aero Soln inhalation aerosol, Inhale 2 Puffs every 6 hours as needed for Shortness of Breath., Disp: 18 g, Rfl: 3    budesonide-formoterol (SYMBICORT) 160-4.5 MCG/ACT Aerosol, Inhale 2 Puffs 2 times a day., Disp: 10.2 g, Rfl: 3    Ascorbic Acid (VITAMIN C) 1000 MG Tab, Take  by mouth., Disp: , Rfl:   ALLERGIES:   Allergies   Allergen Reactions    Sulfa Drugs      2002-02-20;arthritic symptoms  \"due to my lupus\"  2002-02-20;arthritic symptoms     SURGHX:   Past Surgical History:   Procedure Laterality Date    LAMINOTOMY      OTHER      RLE skin melanoma excised    OTHER ORTHOPEDIC SURGERY      spinal     PRIMARY C SECTION  1986,1987    TONSILLECTOMY       SOCHX:  reports that she has never smoked. She has never used smokeless tobacco. She reports that she does not currently use alcohol after a past usage of about 1.5 oz of alcohol per week. She reports that she does not currently use drugs after having used the following drugs: Marijuana and Oral.  FH: family history includes Arthritis (age of onset: 85) in her maternal grandmother; Cancer in her mother and sister; Heart Disease (age of onset: 40) in her father; Hyperlipidemia in her sister; Hypertension in her sister.      Review of Systems   Constitutional:  Negative for chills and fever.   HENT:  Positive for congestion, postnasal drip and rhinorrhea. Negative for ear pain and sore throat.    Respiratory:  Positive for cough and sputum production. Negative for shortness of breath and wheezing.    Cardiovascular: Negative.  Negative for chest pain, palpitations and leg swelling.   Gastrointestinal:  Negative for abdominal pain, diarrhea, nausea and vomiting.   Genitourinary: Negative.    Musculoskeletal:  Negative for myalgias.   Neurological:  Positive for headaches. Negative for dizziness.       Medications, Allergies, and current problem list reviewed today in Epic           Objective     /78   Pulse 78   Temp 36.2 °C " "(97.2 °F) (Temporal)   Resp (!) 22   Ht 1.676 m (5' 6\")   Wt 74.4 kg (164 lb)   LMP 02/15/2002   SpO2 97%   BMI 26.47 kg/m²      Physical Exam  Vitals and nursing note reviewed.   Constitutional:       General: She is not in acute distress.     Appearance: Normal appearance. She is well-developed. She is not ill-appearing, toxic-appearing or diaphoretic.   HENT:      Head: Normocephalic and atraumatic.      Right Ear: Hearing, tympanic membrane, ear canal and external ear normal. No decreased hearing noted. Tympanic membrane is not erythematous.      Left Ear: Hearing, tympanic membrane, ear canal and external ear normal. No decreased hearing noted. Tympanic membrane is not erythematous.      Nose: Mucosal edema, congestion and rhinorrhea present. Rhinorrhea is purulent.      Right Turbinates: Swollen.      Left Turbinates: Swollen.      Right Sinus: Maxillary sinus tenderness present.      Left Sinus: Maxillary sinus tenderness present.      Mouth/Throat:      Mouth: Mucous membranes are moist.      Dentition: Normal dentition.      Pharynx: Posterior oropharyngeal erythema present. No oropharyngeal exudate.   Eyes:      General: No scleral icterus.        Right eye: No discharge.         Left eye: No discharge.      Conjunctiva/sclera: Conjunctivae normal.   Cardiovascular:      Rate and Rhythm: Normal rate and regular rhythm.      Pulses: Normal pulses.      Heart sounds: Normal heart sounds. No murmur heard.  Pulmonary:      Effort: Pulmonary effort is normal. No respiratory distress.      Breath sounds: Normal breath sounds. No stridor. No wheezing, rhonchi or rales.      Comments: Productive bronchial cough  Musculoskeletal:      Cervical back: Normal range of motion and neck supple.   Lymphadenopathy:      Cervical: No cervical adenopathy.   Skin:     General: Skin is warm and dry.      Nails: There is no clubbing.   Neurological:      General: No focal deficit present.      Mental Status: She is alert " and oriented to person, place, and time. Mental status is at baseline.   Psychiatric:         Mood and Affect: Mood normal.         Behavior: Behavior normal.         Thought Content: Thought content normal.         Judgment: Judgment normal.                             Assessment & Plan     This is a very pleasant 68-year-old female presenting with sinusitis symptoms for the last 3+ weeks.  Second visit to urgent care.  Previously diagnosed with viral illness, no interval improvement.  Is having productive cough with tight chest but does have a history of mild asthma.  No significant shortness of breath, wheezing, chest pain, leg swelling or calf tenderness.  Started with fever chills and bodyaches but nothing recently.  Eating and drinking normal without vomiting or diarrhea.  pO2 is adequate and vital signs are normal.  Sinusitis symptoms seen on exam.  Productive cough but no significant wheezing, rhonchi, rales or stridor indicating lower respiratory involvement.  Patient be treated for acute sinusitis based on duration of symptoms and failure to improve with OTC/conservative measures.       1. RTI (respiratory tract infection)  doxycycline (VIBRAMYCIN) 100 MG Tab    methylPREDNISolone (MEDROL DOSEPAK) 4 MG Tablet Therapy Pack    benzonatate (TESSALON) 100 MG Cap      2. Mild intermittent asthma with acute exacerbation  methylPREDNISolone (MEDROL DOSEPAK) 4 MG Tablet Therapy Pack            I personally reviewed prior external notes and test results pertinent to today's visit. Return to clinic or go to ED if symptoms worsen or persist. Red flag symptoms and indications for ED discussed at length. Patient/Parent/Guardian voices understanding.  AVS with post-visit instructions printed and provided or given verbally.  Follow-up with your primary care provider in 3-5 days. All side effects and potential interactions of prescribed medication discussed including allergic response, GI upset, tendon injury, rash,  sedation, OCP effectiveness, etc.    Please note that this dictation was created using voice recognition software. I have made every reasonable attempt to correct obvious errors, but I expect that there are errors of grammar and possibly content that I did not discover before finalizing the note.

## 2024-08-13 ENCOUNTER — APPOINTMENT (RX ONLY)
Dept: URBAN - METROPOLITAN AREA CLINIC 22 | Facility: CLINIC | Age: 69
Setting detail: DERMATOLOGY
End: 2024-08-13

## 2024-08-13 DIAGNOSIS — R23.3 SPONTANEOUS ECCHYMOSES: ICD-10-CM

## 2024-08-13 DIAGNOSIS — D22 MELANOCYTIC NEVI: ICD-10-CM

## 2024-08-13 DIAGNOSIS — L82.1 OTHER SEBORRHEIC KERATOSIS: ICD-10-CM

## 2024-08-13 DIAGNOSIS — L57.0 ACTINIC KERATOSIS: ICD-10-CM

## 2024-08-13 DIAGNOSIS — L81.4 OTHER MELANIN HYPERPIGMENTATION: ICD-10-CM

## 2024-08-13 DIAGNOSIS — Z85.820 PERSONAL HISTORY OF MALIGNANT MELANOMA OF SKIN: ICD-10-CM

## 2024-08-13 PROBLEM — D22.5 MELANOCYTIC NEVI OF TRUNK: Status: ACTIVE | Noted: 2024-08-13

## 2024-08-13 PROBLEM — D22.71 MELANOCYTIC NEVI OF RIGHT LOWER LIMB, INCLUDING HIP: Status: ACTIVE | Noted: 2024-08-13

## 2024-08-13 PROBLEM — D22.72 MELANOCYTIC NEVI OF LEFT LOWER LIMB, INCLUDING HIP: Status: ACTIVE | Noted: 2024-08-13

## 2024-08-13 PROCEDURE — ? SUNSCREEN RECOMMENDATIONS

## 2024-08-13 PROCEDURE — 99213 OFFICE O/P EST LOW 20 MIN: CPT | Mod: 25

## 2024-08-13 PROCEDURE — ? COUNSELING

## 2024-08-13 PROCEDURE — 17000 DESTRUCT PREMALG LESION: CPT

## 2024-08-13 PROCEDURE — 17003 DESTRUCT PREMALG LES 2-14: CPT

## 2024-08-13 PROCEDURE — ? LIQUID NITROGEN

## 2024-08-13 ASSESSMENT — LOCATION SIMPLE DESCRIPTION DERM
LOCATION SIMPLE: RIGHT SHOULDER
LOCATION SIMPLE: UPPER BACK
LOCATION SIMPLE: RIGHT UPPER BACK
LOCATION SIMPLE: RIGHT BUTTOCK
LOCATION SIMPLE: INFERIOR FOREHEAD
LOCATION SIMPLE: LEFT THIGH
LOCATION SIMPLE: LEFT FOREARM
LOCATION SIMPLE: RIGHT THIGH
LOCATION SIMPLE: RIGHT LIP
LOCATION SIMPLE: LEFT PRETIBIAL REGION
LOCATION SIMPLE: RIGHT PRETIBIAL REGION
LOCATION SIMPLE: RIGHT FOREARM
LOCATION SIMPLE: LEFT ANTERIOR NECK
LOCATION SIMPLE: ABDOMEN
LOCATION SIMPLE: LEFT SHOULDER
LOCATION SIMPLE: LEFT BUTTOCK
LOCATION SIMPLE: CHEST

## 2024-08-13 ASSESSMENT — LOCATION DETAILED DESCRIPTION DERM
LOCATION DETAILED: RIGHT MEDIAL UPPER BACK
LOCATION DETAILED: LEFT ANTERIOR DISTAL THIGH
LOCATION DETAILED: INFERIOR THORACIC SPINE
LOCATION DETAILED: LEFT VENTRAL PROXIMAL FOREARM
LOCATION DETAILED: RIGHT BUTTOCK
LOCATION DETAILED: LEFT BUTTOCK
LOCATION DETAILED: RIGHT VENTRAL PROXIMAL FOREARM
LOCATION DETAILED: SUPERIOR THORACIC SPINE
LOCATION DETAILED: LEFT INFERIOR ANTERIOR NECK
LOCATION DETAILED: RIGHT ANTERIOR DISTAL THIGH
LOCATION DETAILED: EPIGASTRIC SKIN
LOCATION DETAILED: RIGHT POSTERIOR SHOULDER
LOCATION DETAILED: INFERIOR MID FOREHEAD
LOCATION DETAILED: LEFT POSTERIOR SHOULDER
LOCATION DETAILED: LEFT PROXIMAL PRETIBIAL REGION
LOCATION DETAILED: RIGHT UPPER CUTANEOUS LIP
LOCATION DETAILED: RIGHT ANTERIOR SHOULDER
LOCATION DETAILED: RIGHT LATERAL SUPERIOR CHEST
LOCATION DETAILED: RIGHT PROXIMAL PRETIBIAL REGION
LOCATION DETAILED: LEFT PROXIMAL DORSAL FOREARM

## 2024-08-13 ASSESSMENT — LOCATION ZONE DERM
LOCATION ZONE: NECK
LOCATION ZONE: LIP
LOCATION ZONE: TRUNK
LOCATION ZONE: FACE
LOCATION ZONE: ARM
LOCATION ZONE: LEG

## 2024-08-13 NOTE — PROCEDURE: SUNSCREEN RECOMMENDATIONS
Products Recommended: Elizabeth Walsh \\nElta md UV clear
Detail Level: Zone
General Sunscreen Counseling: I recommended a broad spectrum sunscreen with a SPF of 30 or higher.  I explained that SPF 30 sunscreens block approximately 97 percent of the sun's harmful rays.  Sunscreens should be applied at least 15 minutes prior to expected sun exposure and then every 2 hours after that as long as sun exposure continues. If swimming or exercising sunscreen should be reapplied every 45 minutes to an hour after getting wet or sweating.  One ounce, or the equivalent of a shot glass full of sunscreen, is adequate to protect the skin not covered by a bathing suit. I also recommended a lip balm with a sunscreen as well. Sun protective clothing can be used in lieu of sunscreen but must be worn the entire time you are exposed to the sun's rays.

## 2024-11-05 ENCOUNTER — APPOINTMENT (RX ONLY)
Dept: URBAN - METROPOLITAN AREA CLINIC 22 | Facility: CLINIC | Age: 69
Setting detail: DERMATOLOGY
End: 2024-11-05

## 2024-11-05 DIAGNOSIS — L82.0 INFLAMED SEBORRHEIC KERATOSIS: ICD-10-CM

## 2024-11-05 DIAGNOSIS — L82.1 OTHER SEBORRHEIC KERATOSIS: ICD-10-CM

## 2024-11-05 PROCEDURE — ? LIQUID NITROGEN

## 2024-11-05 PROCEDURE — ? ADDITIONAL NOTES

## 2024-11-05 PROCEDURE — 17110 DESTRUCTION B9 LES UP TO 14: CPT

## 2024-11-05 PROCEDURE — 99212 OFFICE O/P EST SF 10 MIN: CPT | Mod: 25

## 2024-11-05 PROCEDURE — ? COUNSELING

## 2024-11-05 ASSESSMENT — LOCATION ZONE DERM
LOCATION ZONE: ARM
LOCATION ZONE: TRUNK

## 2024-11-05 ASSESSMENT — LOCATION SIMPLE DESCRIPTION DERM
LOCATION SIMPLE: RIGHT SHOULDER
LOCATION SIMPLE: CHEST

## 2024-11-05 ASSESSMENT — LOCATION DETAILED DESCRIPTION DERM
LOCATION DETAILED: RIGHT LATERAL SUPERIOR CHEST
LOCATION DETAILED: RIGHT ANTERIOR SHOULDER

## 2024-11-05 NOTE — PROCEDURE: LIQUID NITROGEN
Show Aperture Variable?: Yes
Render Note In Bullet Format When Appropriate: No
Medical Necessity Clause: This procedure was medically necessary because the lesions that were treated were:
Medical Necessity Information: It is in your best interest to select a reason for this procedure from the list below. All of these items fulfill various CMS LCD requirements except the new and changing color options.
Detail Level: Detailed
Post-Care Instructions: I reviewed with the patient in detail post-care instructions. Patient is to wear sunprotection, and avoid picking at any of the treated lesions. Pt may apply Vaseline to crusted or scabbing areas.
Spray Paint Text: The liquid nitrogen was applied to the skin utilizing a spray paint frosting technique.
Consent: The patient's mom’s consent was obtained including but not limited to risks of crusting, scabbing, blistering, scarring, darker or lighter pigmentary change, recurrence, incomplete removal and infection.

## 2024-11-05 NOTE — PROCEDURE: ADDITIONAL NOTES
Render Risk Assessment In Note?: no
Additional Notes: Pt gave verbal consent to proceed w/ LN2.
Detail Level: Simple